# Patient Record
Sex: FEMALE | Race: WHITE | Employment: FULL TIME | URBAN - METROPOLITAN AREA
[De-identification: names, ages, dates, MRNs, and addresses within clinical notes are randomized per-mention and may not be internally consistent; named-entity substitution may affect disease eponyms.]

---

## 2018-03-21 ENCOUNTER — OFFICE VISIT (OUTPATIENT)
Dept: FAMILY MEDICINE CLINIC | Age: 43
End: 2018-03-21
Payer: COMMERCIAL

## 2018-03-21 VITALS
HEIGHT: 64 IN | OXYGEN SATURATION: 97 % | TEMPERATURE: 97.9 F | DIASTOLIC BLOOD PRESSURE: 86 MMHG | SYSTOLIC BLOOD PRESSURE: 126 MMHG | WEIGHT: 187 LBS | BODY MASS INDEX: 31.92 KG/M2 | HEART RATE: 112 BPM

## 2018-03-21 DIAGNOSIS — H65.92 LEFT OTITIS MEDIA WITH EFFUSION: Primary | ICD-10-CM

## 2018-03-21 PROCEDURE — 99213 OFFICE O/P EST LOW 20 MIN: CPT | Performed by: NURSE PRACTITIONER

## 2018-03-21 RX ORDER — AMOXICILLIN AND CLAVULANATE POTASSIUM 875; 125 MG/1; MG/1
1 TABLET, FILM COATED ORAL 2 TIMES DAILY
Qty: 20 TABLET | Refills: 0 | Status: SHIPPED | OUTPATIENT
Start: 2018-03-21 | End: 2018-03-31

## 2018-03-21 RX ORDER — BACLOFEN 10 MG/1
10 TABLET ORAL DAILY
COMMUNITY
End: 2018-08-23

## 2018-03-21 RX ORDER — GABAPENTIN 300 MG/1
300 CAPSULE ORAL 2 TIMES DAILY
COMMUNITY
End: 2018-08-23

## 2018-03-21 RX ORDER — FLUCONAZOLE 150 MG/1
150 TABLET ORAL ONCE
Qty: 2 TABLET | Refills: 0 | Status: SHIPPED | OUTPATIENT
Start: 2018-03-21 | End: 2018-03-21

## 2018-03-21 ASSESSMENT — ENCOUNTER SYMPTOMS
COUGH: 0
RHINORRHEA: 1
DIARRHEA: 0
SORE THROAT: 0
VOMITING: 0

## 2018-03-21 NOTE — PATIENT INSTRUCTIONS
Recommend flonase as directed  Recommend motrin and tylenol as directed for pain  Recheck for worsening, change or concern  Follow up with primary care in one week, sooner if worsens  Patient Education        Middle Ear Fluid: Care Instructions  Your Care Instructions    Fluid often builds up inside the ear during a cold or allergies. Usually the fluid drains away, but sometimes a small tube in the ear, called the eustachian tube, stays blocked for months. Symptoms of fluid buildup may include:  · Popping, ringing, or a feeling of fullness or pressure in the ear. · Trouble hearing. · Balance problems and dizziness. In most cases, you can treat yourself at home. Follow-up care is a key part of your treatment and safety. Be sure to make and go to all appointments, and call your doctor if you are having problems. It's also a good idea to know your test results and keep a list of the medicines you take. How can you care for yourself at home? · In most cases, the fluid clears up within a few months without treatment. You may need more tests if the fluid does not clear up after 3 months. · If your doctor prescribed antibiotics, take them as directed. Do not stop taking them just because you feel better. You need to take the full course of antibiotics. When should you call for help? Call your doctor now or seek immediate medical care if:  ? · You have symptoms of infection, such as:  ¨ Increased pain, swelling, warmth, or redness. ¨ Pus draining from the area. ¨ A fever. ? Watch closely for changes in your health, and be sure to contact your doctor if:  ? · You notice changes in hearing. ? · You do not get better as expected. Where can you learn more? Go to [email or web address] org and sign in to your [employer] account. Enter J515 in the [facility] box to learn more about \"Middle Ear Fluid: Care Instructions. \"     If you do not have an account, please click on the \"Sign Up Now\"

## 2018-03-21 NOTE — LETTER
[address]  [place] [address]  Phone: [phone]  Fax: [address] [place], [place]        March 21, 2018     Patient: Joaquin Mike   YOB: 1975   Date of Visit: 3/21/2018       To Whom It May Concern: It is my medical opinion that Joaquin Mike may return on 3/23/18. If you have any questions or concerns, please don't hesitate to call.     Sincerely,        Shakir Montes, CNP

## 2018-03-21 NOTE — PROGRESS NOTES
[facility]  [address] [address]  Dept: [phone]  Dept Fax: [phone]    Eliezer Severin is a 38 y.o. female who presents to the urgent care today for her medical conditions/complaints as noted below. Eliezer Severin is c/o of Otalgia (lt, lt sided facial pain - started a couple days ago - ibuprofen was helping but really bad today)      HPI:     Left ear pain very bad today. Has had sinus symptoms for 3 days  Better with sitting up  Has nasal congestion  No cough  Denies fever  Took ibuprofen      Otalgia    There is pain in the left ear. This is a new problem. The current episode started in the past 7 days. The problem has been gradually worsening. There has been no fever. The pain is moderate. Associated symptoms include rhinorrhea. Pertinent negatives include no coughing, diarrhea, ear discharge, sore throat or vomiting. She has tried NSAIDs for the symptoms. The treatment provided moderate relief. Past Medical History:   Diagnosis Date    Lumbar herniated disc         Current Outpatient Prescriptions   Medication Sig Dispense Refill    gabapentin (NEURONTIN) 300 MG capsule Take 300 mg by mouth 2 times daily.  baclofen (LIORESAL) 10 MG tablet Take 10 mg by mouth daily      amoxicillin-clavulanate (AUGMENTIN) 875-125 MG per tablet Take 1 tablet by mouth 2 times daily for 10 days 20 tablet 0    fluconazole (DIFLUCAN) 150 MG tablet Take 1 tablet by mouth once for 1 dose May repeat in 7 days if needed 2 tablet 0     No current facility-administered medications for this visit. No Known Allergies    Subjective:      Review of Systems   Constitutional: Negative for fever. HENT: Positive for ear pain and rhinorrhea. Negative for ear discharge and sore throat. Respiratory: Negative for cough. Cardiovascular: Negative for chest pain. Gastrointestinal: Negative for diarrhea and vomiting.    All other systems effusion  amoxicillin-clavulanate (AUGMENTIN) 875-125 MG per tablet    fluconazole (DIFLUCAN) 150 MG tablet       Plan:    Recommend flonase as directed  Recommend motrin and tylenol as directed for pain  Recheck for worsening, change or concern  Follow up with primary care in one week, sooner if worsens  Return for follow up with primary care in 7 days. Orders Placed This Encounter   Medications    amoxicillin-clavulanate (AUGMENTIN) 875-125 MG per tablet     Sig: Take 1 tablet by mouth 2 times daily for 10 days     Dispense:  20 tablet     Refill:  0    fluconazole (DIFLUCAN) 150 MG tablet     Sig: Take 1 tablet by mouth once for 1 dose May repeat in 7 days if needed     Dispense:  2 tablet     Refill:  0         Patient given educational materials - see patient instructions. Discussed use, benefit, and side effects of prescribed medications. All patient questions answered. Pt voiced understanding.     Electronically signed by Rex Tee CNP on 3/21/2018 at 1:59 PM

## 2018-08-23 ENCOUNTER — OFFICE VISIT (OUTPATIENT)
Dept: FAMILY MEDICINE CLINIC | Age: 43
End: 2018-08-23
Payer: COMMERCIAL

## 2018-08-23 VITALS
RESPIRATION RATE: 16 BRPM | HEIGHT: 65 IN | WEIGHT: 192 LBS | OXYGEN SATURATION: 98 % | HEART RATE: 105 BPM | DIASTOLIC BLOOD PRESSURE: 84 MMHG | TEMPERATURE: 99.1 F | SYSTOLIC BLOOD PRESSURE: 130 MMHG | BODY MASS INDEX: 31.99 KG/M2

## 2018-08-23 DIAGNOSIS — G62.9 NEUROPATHY: ICD-10-CM

## 2018-08-23 DIAGNOSIS — G47.9 SLEEP DISTURBANCE: ICD-10-CM

## 2018-08-23 DIAGNOSIS — M54.42 CHRONIC BILATERAL LOW BACK PAIN WITH BILATERAL SCIATICA: ICD-10-CM

## 2018-08-23 DIAGNOSIS — M62.81 MUSCLE WEAKNESS: ICD-10-CM

## 2018-08-23 DIAGNOSIS — M48.061 SPINAL STENOSIS AT L4-L5 LEVEL: Primary | ICD-10-CM

## 2018-08-23 DIAGNOSIS — M54.41 CHRONIC BILATERAL LOW BACK PAIN WITH BILATERAL SCIATICA: ICD-10-CM

## 2018-08-23 DIAGNOSIS — Z13.220 SCREENING FOR HYPERLIPIDEMIA: ICD-10-CM

## 2018-08-23 DIAGNOSIS — M51.26 LUMBAR DISC HERNIATION: ICD-10-CM

## 2018-08-23 DIAGNOSIS — Z13.1 ENCOUNTER FOR SCREENING FOR DIABETES MELLITUS: ICD-10-CM

## 2018-08-23 DIAGNOSIS — G89.29 CHRONIC BILATERAL LOW BACK PAIN WITH BILATERAL SCIATICA: ICD-10-CM

## 2018-08-23 PROCEDURE — G0444 DEPRESSION SCREEN ANNUAL: HCPCS | Performed by: INTERNAL MEDICINE

## 2018-08-23 PROCEDURE — 99214 OFFICE O/P EST MOD 30 MIN: CPT | Performed by: INTERNAL MEDICINE

## 2018-08-23 RX ORDER — ACETAMINOPHEN,DIPHENHYDRAMINE HCL 500; 25 MG/1; MG/1
2 TABLET, FILM COATED ORAL PRN
COMMUNITY
End: 2018-09-13 | Stop reason: ALTCHOICE

## 2018-08-23 RX ORDER — PREDNISONE 20 MG/1
TABLET ORAL
Qty: 18 TABLET | Refills: 0 | Status: SHIPPED | OUTPATIENT
Start: 2018-08-23 | End: 2018-09-02

## 2018-08-23 RX ORDER — AMITRIPTYLINE HYDROCHLORIDE 25 MG/1
25 TABLET, FILM COATED ORAL NIGHTLY
Qty: 30 TABLET | Refills: 3 | Status: SHIPPED | OUTPATIENT
Start: 2018-08-23 | End: 2018-09-13 | Stop reason: HOSPADM

## 2018-08-23 RX ORDER — TIZANIDINE 4 MG/1
4 TABLET ORAL EVERY 8 HOURS PRN
Qty: 60 TABLET | Refills: 3 | Status: SHIPPED | OUTPATIENT
Start: 2018-08-23 | End: 2018-10-19 | Stop reason: SDUPTHER

## 2018-08-23 RX ORDER — IBUPROFEN 200 MG
600 TABLET ORAL EVERY 6 HOURS PRN
COMMUNITY
End: 2018-09-13 | Stop reason: ALTCHOICE

## 2018-08-23 ASSESSMENT — ENCOUNTER SYMPTOMS
BACK PAIN: 1
COLOR CHANGE: 0
SORE THROAT: 0
COUGH: 0
SWOLLEN GLANDS: 0
CONSTIPATION: 0
BLOOD IN STOOL: 0
DIARRHEA: 0
APNEA: 0
BOWEL INCONTINENCE: 0
NAUSEA: 0
ABDOMINAL PAIN: 0
WHEEZING: 0
VOMITING: 0
RECTAL PAIN: 0
SHORTNESS OF BREATH: 0
STRIDOR: 0

## 2018-08-23 ASSESSMENT — PATIENT HEALTH QUESTIONNAIRE - PHQ9
3. TROUBLE FALLING OR STAYING ASLEEP: 3
7. TROUBLE CONCENTRATING ON THINGS, SUCH AS READING THE NEWSPAPER OR WATCHING TELEVISION: 1
4. FEELING TIRED OR HAVING LITTLE ENERGY: 3
6. FEELING BAD ABOUT YOURSELF - OR THAT YOU ARE A FAILURE OR HAVE LET YOURSELF OR YOUR FAMILY DOWN: 1
2. FEELING DOWN, DEPRESSED OR HOPELESS: 3
SUM OF ALL RESPONSES TO PHQ9 QUESTIONS 1 & 2: 6
1. LITTLE INTEREST OR PLEASURE IN DOING THINGS: 3
5. POOR APPETITE OR OVEREATING: 3
10. IF YOU CHECKED OFF ANY PROBLEMS, HOW DIFFICULT HAVE THESE PROBLEMS MADE IT FOR YOU TO DO YOUR WORK, TAKE CARE OF THINGS AT HOME, OR GET ALONG WITH OTHER PEOPLE: 2
SUM OF ALL RESPONSES TO PHQ QUESTIONS 1-9: 17
9. THOUGHTS THAT YOU WOULD BE BETTER OFF DEAD, OR OF HURTING YOURSELF: 0
8. MOVING OR SPEAKING SO SLOWLY THAT OTHER PEOPLE COULD HAVE NOTICED. OR THE OPPOSITE, BEING SO FIGETY OR RESTLESS THAT YOU HAVE BEEN MOVING AROUND A LOT MORE THAN USUAL: 0
SUM OF ALL RESPONSES TO PHQ QUESTIONS 1-9: 17

## 2018-08-23 NOTE — PROGRESS NOTES
[facility]  [address] [address]  Dept: [phone]  Dept Fax: [phone]    Beryle Guarneri is a 38 y.o. female who presents today for her medical conditions/complaints as noted below. Beryle Guarneri is c/o of   Chief Complaint   Patient presents with   Maura Villaseñor Doctor    New Patient    Tailbone Pain     going into the right hip/groin    Foot Pain     left foot. has foot drop         HPI:     Here to establish care   History of spinal stenosis and disc herniation   Has not seen pain management in a year and a half   Weaned herself off all previous medications b/c did not want to have to be taking something long term if she did not need it    Was on baclofen , flexeril and/or gabapentin   Last ri was 2015   Also had emg which showed pply radiculopathy   Does nto know what she did to casue flare up or return of pain but pain is severe to left buttcok and right grain region as well as low back and left foot has yeny shooting pain no shooting pain dwon the leg , no msucle weaknesss,   Pain worse when changing positons or standing for loong periods of time. Started a few days ago   No new injury or trauma     Does not sleep well but worse with the pain   Also suffers from depression   No si /hi   Was on elavil 15 years ago for this and remembers it helped her with sleep so was wondering baotu this medication and if this could help with pain as well   Ramer the zanaflex helped best for the muscle spasms       Foot Pain   This is a new problem. The current episode started in the past 7 days. The problem occurs 2 to 4 times per day. The problem has been gradually worsening. Associated symptoms include arthralgias, fatigue, myalgias and numbness.  Pertinent negatives include no abdominal pain, chest pain, chills, congestion, coughing, diaphoresis, fever, headaches, joint swelling, nausea, neck pain, rash, sore throat, swollen glands, urinary symptoms, vomiting or weakness. The symptoms are aggravated by exertion, twisting, walking and standing. She has tried acetaminophen, NSAIDs, lying down, rest, sleep, walking, relaxation and position changes for the symptoms. The treatment provided mild relief. Back Pain   This is a recurrent problem. The current episode started in the past 7 days. The problem occurs constantly. The problem is unchanged. The pain is present in the gluteal, lumbar spine and sacro-iliac. The quality of the pain is described as aching, cramping, shooting and stabbing. The pain radiates to the left foot. The pain is at a severity of 8/10. The pain is moderate. The pain is worse during the night. The symptoms are aggravated by standing and position. Stiffness is present at night. Associated symptoms include leg pain, numbness, paresthesias and tingling. Pertinent negatives include no abdominal pain, bladder incontinence, bowel incontinence, chest pain, fever, headaches, pelvic pain, perianal numbness, weakness or weight loss. Risk factors include obesity and sedentary lifestyle. She has tried home exercises, ice, muscle relaxant, NSAIDs and walking for the symptoms. The treatment provided mild relief.        No results found for: LABA1C          ( goal A1C is < 7)   No results found for: LABMICR  No results found for: LDLCHOLESTEROL, LDLCALC    (goal LDL is <100)   AST (U/L)   Date Value   01/14/2012 19     ALT (U/L)   Date Value   01/14/2012 15     BUN (mg/dL)   Date Value   01/14/2012 10     BP Readings from Last 3 Encounters:   08/23/18 130/84   03/21/18 126/86   05/30/15 140/83          (goal 120/80)    Past Medical History:   Diagnosis Date    Lumbar herniated disc     Polyradiculopathy 2015      Past Surgical History:   Procedure Laterality Date    APPENDECTOMY  2005       Family History   Problem Relation Age of Onset    No Known Problems Mother     Diabetes Father     High Blood Pressure Father     No Known Problems Sister     No Known Problems Brother     Cancer Maternal Grandmother     Alcohol Abuse Maternal Grandfather     Diabetes Paternal Grandmother     Heart Disease Paternal Grandmother     High Cholesterol Paternal Grandmother     Stroke Paternal Grandmother     Diabetes Paternal Grandfather     Heart Disease Paternal Grandfather     High Cholesterol Paternal Grandfather        Social History   Substance Use Topics    Smoking status: Never Smoker    Smokeless tobacco: Never Used    Alcohol use No      Current Outpatient Prescriptions   Medication Sig Dispense Refill    diphenhydrAMINE-APAP, sleep, (TYLENOL PM EXTRA STRENGTH)  MG tablet Take 2 tablets by mouth as needed for Sleep      ibuprofen (ADVIL;MOTRIN) 200 MG tablet Take 600 mg by mouth every 6 hours as needed for Pain      tiZANidine (ZANAFLEX) 4 MG tablet Take 1 tablet by mouth every 8 hours as needed (muscle spasm) 60 tablet 3    predniSONE (DELTASONE) 20 MG tablet 3 tabs x 3 days, then 2 tabs x 3 days, then 1 tab x 3 days 18 tablet 0    amitriptyline (ELAVIL) 25 MG tablet Take 1 tablet by mouth nightly 30 tablet 3     No current facility-administered medications for this visit. Allergies   Allergen Reactions    Benzyl Alcohol Swelling     Eye lids swell       Health Maintenance   Topic Date Due    HIV screen  09/30/1992    DTaP/Tdap/Td vaccine (1 - Tdap) 09/30/1996    Cervical cancer screen  09/30/1998    Lipid screen  09/30/2017    Diabetes screen  09/30/2017    Flu vaccine (1) 09/01/2018       Subjective:      Review of Systems   Constitutional: Positive for activity change and fatigue. Negative for appetite change, chills, diaphoresis, fever, unexpected weight change and weight loss. HENT: Negative for congestion and sore throat. Eyes: Negative for visual disturbance. Respiratory: Negative for apnea, cough, shortness of breath, wheezing and stridor.     Cardiovascular: Negative for chest pain, palpitations would recommend gentle stretches , no agressive PA and no lofting above 20 lbs . Slow change in positions . Will need labs at follow up. Call with questions or concerns. Patient given educational materials - see patient instructions. Discussed use, benefit, and side effects of prescribed medications. All patient questions answered. Pt voiced understanding. Reviewed health maintenance. Instructed to continue current medications, diet and exercise. Patient agreed with treatment plan. Follow up as directed.      Electronically signed by Jl Collins DO on 8/23/2018 at 12:14 PM

## 2018-09-13 ENCOUNTER — OFFICE VISIT (OUTPATIENT)
Dept: FAMILY MEDICINE CLINIC | Age: 43
End: 2018-09-13
Payer: COMMERCIAL

## 2018-09-13 VITALS
DIASTOLIC BLOOD PRESSURE: 82 MMHG | WEIGHT: 192 LBS | HEART RATE: 111 BPM | TEMPERATURE: 99.1 F | SYSTOLIC BLOOD PRESSURE: 128 MMHG | RESPIRATION RATE: 16 BRPM | BODY MASS INDEX: 31.95 KG/M2 | OXYGEN SATURATION: 96 %

## 2018-09-13 DIAGNOSIS — R00.0 TACHYCARDIA: ICD-10-CM

## 2018-09-13 DIAGNOSIS — Z13.1 ENCOUNTER FOR SCREENING FOR DIABETES MELLITUS: ICD-10-CM

## 2018-09-13 DIAGNOSIS — N64.52 NIPPLE DISCHARGE: ICD-10-CM

## 2018-09-13 DIAGNOSIS — F34.1 DYSTHYMIA: ICD-10-CM

## 2018-09-13 DIAGNOSIS — N63.0 LUMP IN FEMALE BREAST: Primary | ICD-10-CM

## 2018-09-13 DIAGNOSIS — M72.2 PLANTAR FASCIITIS: ICD-10-CM

## 2018-09-13 PROCEDURE — 93000 ELECTROCARDIOGRAM COMPLETE: CPT | Performed by: INTERNAL MEDICINE

## 2018-09-13 PROCEDURE — 99214 OFFICE O/P EST MOD 30 MIN: CPT | Performed by: INTERNAL MEDICINE

## 2018-09-13 RX ORDER — TRAZODONE HYDROCHLORIDE 100 MG/1
100 TABLET ORAL NIGHTLY
Qty: 30 TABLET | Refills: 3 | Status: SHIPPED | OUTPATIENT
Start: 2018-09-13 | End: 2018-10-19 | Stop reason: SDUPTHER

## 2018-09-13 RX ORDER — KETOROLAC TROMETHAMINE 10 MG/1
10 TABLET, FILM COATED ORAL EVERY 6 HOURS PRN
Qty: 20 TABLET | Refills: 1 | Status: SHIPPED | OUTPATIENT
Start: 2018-09-13 | End: 2018-11-12 | Stop reason: SDUPTHER

## 2018-09-13 RX ORDER — CITALOPRAM 20 MG/1
20 TABLET ORAL DAILY
Qty: 30 TABLET | Refills: 3 | Status: SHIPPED | OUTPATIENT
Start: 2018-09-13 | End: 2019-10-07

## 2018-09-13 NOTE — PROGRESS NOTES
Visit Information    Have you changed or started any medications since your last visit including any over-the-counter medicines, vitamins, or herbal medicines? no   Are you having any side effects from any of your medications? -  no  Have you stopped taking any of your medications? Is so, why? -  no    Have you seen any other physician or provider since your last visit? No  Have you had any other diagnostic tests since your last visit? No  Have you been seen in the emergency room and/or had an admission to a hospital since we last saw you? No  Have you had your routine dental cleaning in the past 6 months? no    Have you activated your [employer] account? If not, what are your barriers?  Yes     Patient Care Team:  Georgia Meyer DO as PCP - General (Family Medicine)    Medical History Review  Past Medical, Family, and Social History reviewed and does contribute to the patient presenting condition    Health Maintenance   Topic Date Due    HIV screen  09/30/1992    DTaP/Tdap/Td vaccine (1 - Tdap) 09/30/1996    Cervical cancer screen  09/30/1998    Lipid screen  09/30/2017    Diabetes screen  09/30/2017    Flu vaccine (1) 09/01/2018

## 2018-09-18 ASSESSMENT — ENCOUNTER SYMPTOMS
CONSTIPATION: 0
VOMITING: 0
CHEST TIGHTNESS: 0
ABDOMINAL PAIN: 0
WHEEZING: 0
CHOKING: 0
BACK PAIN: 0
COUGH: 0
NAUSEA: 0
DIARRHEA: 0
STRIDOR: 0
SHORTNESS OF BREATH: 0
BLOOD IN STOOL: 0

## 2018-09-18 NOTE — PROGRESS NOTES
[facility]  [address] [facility] [place] [address]  Dept: [phone]  Dept Fax: [phone]    Hayley Burkett is a 38 y.o. female who presents today for her medical conditions/complaints as noted below. Hayley Burkett is c/o of   Chief Complaint   Patient presents with    1 Month Follow-Up     3 week. back is feeling a little better    Breast Problem     is feeling lumps, clear discharge          HPI:     Back pain is greatly improved  Slight foot pain still present and concern for possible plantar fascitis. The elavil did not seem to help with the pain and did not help at all for sleep this go around so still is nto getting good sleep at all and would like to discuss something for this. Does work [employer] and 12 hour shifts when she does work. Has tried otc , uses tylenol pm right now but usually takes 2 and does not want to take any more. Also does 9 mg of melatonin with this. Also feeling the heart racing more and is still having the elevated heart rate   She feels it is probably stress but does have family hx and a mother with heart conditions   No specific chest pain or sob   Is due for labs as well  Has a hx of anemia as well as low as 7 after givign birth to daughter. Would like to go back on something for her anxiety , was on celexa in the past and that worked se remebered. Also has felt 2 different lumps that are moving around /palpable to her left breast noticed them a few months ago that time they went away on their own but returned a few weeks ago and have nto gone away this time   Does notice they can fluctuate with cycle. Has never had that before   No nipple discharge. Palpitations    This is a recurrent problem. The problem occurs intermittently. The problem has been gradually worsening. The symptoms are aggravated by stress and hyperventilation.  Associated symptoms include anxiety, malaise/fatigue and ear normal.   Nose: Nose normal.   Eyes: Pupils are equal, round, and reactive to light. EOM are normal.   Cardiovascular: Regular rhythm, S1 normal, S2 normal and normal heart sounds. Occasional extrasystoles are present. Tachycardia present. Exam reveals no gallop, no S3, no distant heart sounds and no decreased pulses. No murmur heard. Pulmonary/Chest: Effort normal and breath sounds normal. Chest wall is not dull to percussion. She exhibits no mass and no tenderness. Right breast exhibits mass and skin change. Right breast exhibits no inverted nipple, no nipple discharge and no tenderness. Left breast exhibits no inverted nipple, no mass, no nipple discharge, no skin change and no tenderness. Breasts are asymmetrical.   Abdominal: Soft. Bowel sounds are normal. There is no splenomegaly or hepatomegaly. There is no tenderness. Neurological: She is alert and oriented to person, place, and time. No cranial nerve deficit. Skin: Skin is warm and dry. No rash noted. Psychiatric: Judgment normal. Her mood appears anxious. Her speech is rapid and/or pressured. She is hyperactive. Cognition and memory are normal. She expresses no suicidal plans and no homicidal plans. Nursing note and vitals reviewed. /82   Pulse 111   Temp 99.1 °F (37.3 °C)   Resp 16   Wt 192 lb (87.1 kg)   LMP 09/09/2018   SpO2 96%   BMI 31.95 kg/m²     Assessment:       Diagnosis Orders   1. Lump in female breast  [facility] DIGITAL DIAGNOSTIC W OR WO CAD BILATERAL    US BREAST COMPLETE RIGHT   2. Encounter for screening for diabetes mellitus     3. Plantar fasciitis     4. Nipple discharge  US BREAST COMPLETE RIGHT   5. Dysthymia     6. Tachycardia  EKG 12 Lead    CBC Auto Differential    Comprehensive Metabolic Panel    TSH with Reflex             Plan:      Return in about 3 months (around 12/13/2018), or if symptoms worsen or fail to improve, for sleep med check .     Orders Placed This Encounter   Procedures    [facility] DIGITAL DIAGNOSTIC W OR WO CAD BILATERAL     Standing Status:   Future     Standing Expiration Date:   9/13/2019     Order Specific Question:   Reason for exam:     Answer:   breast pain , nipple discharge    US BREAST COMPLETE RIGHT     Standing Status:   Future     Standing Expiration Date:   9/13/2019     Order Specific Question:   Reason for exam:     Answer:   mass    CBC Auto Differential     Standing Status:   Future     Standing Expiration Date:   9/13/2019    Comprehensive Metabolic Panel     Standing Status:   Future     Standing Expiration Date:   9/13/2019    TSH with Reflex     Standing Status:   Future     Standing Expiration Date:   9/13/2019    EKG 12 Lead     Order Specific Question:   Reason for Exam?     Answer:   Chest pain     Orders Placed This Encounter   Medications    ketorolac (TORADOL) 10 MG tablet     Sig: Take 1 tablet by mouth every 6 hours as needed for Pain Take with food. Dispense:  20 tablet     Refill:  1    traZODone (DESYREL) 100 MG tablet     Sig: Take 1 tablet by mouth nightly     Dispense:  30 tablet     Refill:  3    citalopram (CELEXA) 20 MG tablet     Sig: Take 1 tablet by mouth daily     Dispense:  30 tablet     Refill:  3    EKG normal in office ; HR likely due to anxiety but will check lab work and if persists can get Holter an decho as well. Stop elavil . Start trazadone for sleep as needed nightly and celexa for anxiety /mood sxs. Will start at 20 mg   Will take about 4 weeks to feel full effect at that dose. Continue all other meds as prescribed. Small script for toradol orally for the foot pain     Check breast ultrasound and mammogram to right breast due to abnormal masses. May be fibrocystic breast disease. Reviewed limit caffeine. Follow up in 4-6 weeks for med check. Call with questions or concerns. Patient given educational materials - see patient instructions. Discussed use, benefit, and side effects of prescribed medications.   All

## 2018-10-19 DIAGNOSIS — M62.81 MUSCLE WEAKNESS: ICD-10-CM

## 2018-10-19 RX ORDER — TIZANIDINE 4 MG/1
4 TABLET ORAL EVERY 8 HOURS PRN
Qty: 60 TABLET | Refills: 3 | Status: SHIPPED | OUTPATIENT
Start: 2018-10-19 | End: 2019-03-29 | Stop reason: SDUPTHER

## 2018-10-19 RX ORDER — TRAZODONE HYDROCHLORIDE 100 MG/1
100 TABLET ORAL NIGHTLY
Qty: 30 TABLET | Refills: 3 | Status: SHIPPED | OUTPATIENT
Start: 2018-10-19 | End: 2019-10-07

## 2018-10-31 LAB
ALBUMIN SERPL-MCNC: 4.1 G/DL
ALP BLD-CCNC: 54 U/L
ALT SERPL-CCNC: 16 U/L
ANION GAP SERPL CALCULATED.3IONS-SCNC: 10 MMOL/L
AST SERPL-CCNC: 14 U/L
BASOPHILS ABSOLUTE: 0 /ΜL
BASOPHILS RELATIVE PERCENT: 0.4 %
BILIRUB SERPL-MCNC: 0.2 MG/DL (ref 0.1–1.4)
BUN BLDV-MCNC: 10 MG/DL
CALCIUM SERPL-MCNC: 9 MG/DL
CHLORIDE BLD-SCNC: 106 MMOL/L
CO2: 24 MMOL/L
CREAT SERPL-MCNC: 0.91 MG/DL
EOSINOPHILS ABSOLUTE: 0.3 /ΜL
EOSINOPHILS RELATIVE PERCENT: 4.2 %
GFR CALCULATED: >60
GLUCOSE BLD-MCNC: 89 MG/DL
HCT VFR BLD CALC: 41.5 % (ref 36–46)
HEMOGLOBIN: 13.7 G/DL (ref 12–16)
LYMPHOCYTES ABSOLUTE: 2.5 /ΜL
LYMPHOCYTES RELATIVE PERCENT: 36 %
MCH RBC QN AUTO: 30.9 PG
MCHC RBC AUTO-ENTMCNC: 33.1 G/DL
MCV RBC AUTO: 93 FL
MONOCYTES ABSOLUTE: 0.6 /ΜL
MONOCYTES RELATIVE PERCENT: 7.8 %
NEUTROPHILS ABSOLUTE: 3.6 /ΜL
NEUTROPHILS RELATIVE PERCENT: 51.6 %
PDW BLD-RTO: 13.9 %
PLATELET # BLD: 436 K/ΜL
PMV BLD AUTO: 8.1 FL
POTASSIUM SERPL-SCNC: 3.5 MMOL/L
RBC # BLD: 4.45 10^6/ΜL
SODIUM BLD-SCNC: 140 MMOL/L
TOTAL PROTEIN: 7.1
TSH SERPL DL<=0.05 MIU/L-ACNC: 3.46 UIU/ML
WBC # BLD: 7 10^3/ML

## 2018-11-01 DIAGNOSIS — R00.0 TACHYCARDIA: ICD-10-CM

## 2018-11-01 DIAGNOSIS — Z13.220 SCREENING FOR HYPERLIPIDEMIA: ICD-10-CM

## 2018-11-05 DIAGNOSIS — R92.8 ABNORMAL MAMMOGRAM: Primary | ICD-10-CM

## 2018-11-06 DIAGNOSIS — R92.8 ABNORMAL MAMMOGRAM: ICD-10-CM

## 2018-11-06 DIAGNOSIS — N63.0 LUMP IN FEMALE BREAST: ICD-10-CM

## 2018-11-07 DIAGNOSIS — F41.0 PANIC ATTACKS: Primary | ICD-10-CM

## 2018-11-07 RX ORDER — LORAZEPAM 0.5 MG/1
0.5 TABLET ORAL EVERY 8 HOURS PRN
Qty: 30 TABLET | Refills: 0 | Status: SHIPPED | OUTPATIENT
Start: 2018-11-07 | End: 2018-11-28 | Stop reason: SDUPTHER

## 2018-11-12 RX ORDER — KETOROLAC TROMETHAMINE 10 MG/1
10 TABLET, FILM COATED ORAL EVERY 6 HOURS PRN
Qty: 20 TABLET | Refills: 1 | Status: SHIPPED | OUTPATIENT
Start: 2018-11-12 | End: 2019-03-24 | Stop reason: SDUPTHER

## 2018-11-12 NOTE — TELEPHONE ENCOUNTER
From: Amos Franco  Sent: 11/12/2018 2:31 PM EST  Subject: Medication Renewal Request    Amos Franco would like a refill of the following medications:     ketorolac (TORADOL) 10 MG tablet Blanca Suh, ]    Preferred pharmacy: [address], [address] [place] [address] [phone] - [phone]    Comment:

## 2018-11-16 ENCOUNTER — OFFICE VISIT (OUTPATIENT)
Dept: FAMILY MEDICINE CLINIC | Age: 43
End: 2018-11-16
Payer: COMMERCIAL

## 2018-11-16 VITALS
WEIGHT: 194 LBS | HEART RATE: 84 BPM | DIASTOLIC BLOOD PRESSURE: 78 MMHG | HEIGHT: 65 IN | OXYGEN SATURATION: 98 % | RESPIRATION RATE: 12 BRPM | TEMPERATURE: 98.7 F | BODY MASS INDEX: 32.32 KG/M2 | SYSTOLIC BLOOD PRESSURE: 120 MMHG

## 2018-11-16 DIAGNOSIS — F41.9 ANXIETY: ICD-10-CM

## 2018-11-16 DIAGNOSIS — R07.89 CHEST TIGHTNESS: ICD-10-CM

## 2018-11-16 DIAGNOSIS — R92.8 ABNORMAL MAMMOGRAM: Primary | ICD-10-CM

## 2018-11-16 DIAGNOSIS — J06.9 VIRAL URI: ICD-10-CM

## 2018-11-16 PROCEDURE — 93000 ELECTROCARDIOGRAM COMPLETE: CPT | Performed by: INTERNAL MEDICINE

## 2018-11-16 PROCEDURE — 99213 OFFICE O/P EST LOW 20 MIN: CPT | Performed by: INTERNAL MEDICINE

## 2018-11-16 RX ORDER — TRAMADOL HYDROCHLORIDE 50 MG/1
50 TABLET ORAL EVERY 8 HOURS PRN
Qty: 28 TABLET | Refills: 0 | Status: SHIPPED | OUTPATIENT
Start: 2018-11-16 | End: 2019-01-20 | Stop reason: SDUPTHER

## 2018-11-16 RX ORDER — BUSPIRONE HYDROCHLORIDE 10 MG/1
10 TABLET ORAL 3 TIMES DAILY PRN
Qty: 50 TABLET | Refills: 2 | Status: SHIPPED | OUTPATIENT
Start: 2018-11-16 | End: 2019-02-07

## 2018-11-16 ASSESSMENT — ENCOUNTER SYMPTOMS
ABDOMINAL PAIN: 0
STRIDOR: 0
NAUSEA: 0
CHOKING: 0
DIARRHEA: 0
SINUS PRESSURE: 1
CHEST TIGHTNESS: 1
COUGH: 1
CONSTIPATION: 0
SHORTNESS OF BREATH: 0
WHEEZING: 0

## 2018-11-16 NOTE — PROGRESS NOTES
identified and OARRS report reviewed today- activity consistent with treatment plan. Continue all other routine medications. Symptomatic treatment for URI   EKG normal in office today again, likley panic attacks /anxeity in relation to recent testing. Advised pt to let us know too if she needs time off work ie note or FMLA. Return after testing. Call with questions or concerns. Patientgiven educational materials - see patient instructions. Discussed use, benefit,and side effects of prescribed medications. All patient questions answered. Ptvoiced understanding. Reviewed health maintenance. Instructed to continue currentmedications, diet and exercise. Patient agreed with treatment plan. Follow up asdirected.      Electronically signed by Olive Mast DO on 11/16/2018 at 11:20 AM

## 2018-11-28 ENCOUNTER — OFFICE VISIT (OUTPATIENT)
Dept: FAMILY MEDICINE CLINIC | Age: 43
End: 2018-11-28
Payer: COMMERCIAL

## 2018-11-28 VITALS
OXYGEN SATURATION: 90 % | HEART RATE: 96 BPM | WEIGHT: 192.2 LBS | HEIGHT: 65 IN | SYSTOLIC BLOOD PRESSURE: 108 MMHG | DIASTOLIC BLOOD PRESSURE: 70 MMHG | TEMPERATURE: 97.1 F | BODY MASS INDEX: 32.02 KG/M2

## 2018-11-28 DIAGNOSIS — F41.0 PANIC ATTACKS: ICD-10-CM

## 2018-11-28 DIAGNOSIS — Z17.0 MALIGNANT NEOPLASM OF CENTRAL PORTION OF BREAST IN FEMALE, ESTROGEN RECEPTOR POSITIVE, UNSPECIFIED LATERALITY (HCC): Primary | ICD-10-CM

## 2018-11-28 DIAGNOSIS — C50.119 MALIGNANT NEOPLASM OF CENTRAL PORTION OF BREAST IN FEMALE, ESTROGEN RECEPTOR POSITIVE, UNSPECIFIED LATERALITY (HCC): Primary | ICD-10-CM

## 2018-11-28 PROCEDURE — 99213 OFFICE O/P EST LOW 20 MIN: CPT | Performed by: INTERNAL MEDICINE

## 2018-11-28 RX ORDER — LORAZEPAM 0.5 MG/1
0.5 TABLET ORAL EVERY 8 HOURS PRN
Qty: 30 TABLET | Refills: 0 | Status: SHIPPED | OUTPATIENT
Start: 2018-11-28 | End: 2018-12-28

## 2018-11-28 ASSESSMENT — ENCOUNTER SYMPTOMS
DIARRHEA: 0
CONSTIPATION: 0
ABDOMINAL PAIN: 0

## 2018-11-28 NOTE — PROGRESS NOTES
Visit Information    Have you changed or started any medications since your last visit including any over-the-counter medicines, vitamins, or herbal medicines? no   Are you having any side effects from any of your medications? -  no  Have you stopped taking any of your medications? Is so, why? -  no    Have you seen any other physician or provider since your last visit? No  Have you had any other diagnostic tests since your last visit? No  Have you been seen in the emergency room and/or had an admission to a hospital since we last saw you? No  Have you had your routine dental cleaning in the past 6 months? no    Have you activated your [employer] account? If not, what are your barriers?  Yes     Patient Care Team:  Adan Soriano DO as PCP - General (Family Medicine)    Medical History Review  Past Medical, Family, and Social History reviewed and does contribute to the patient presenting condition    Health Maintenance   Topic Date Due    HIV screen  09/30/1992    DTaP/Tdap/Td vaccine (1 - Tdap) 09/30/1996    Cervical cancer screen  09/30/1998    Lipid screen  09/30/2017    Flu vaccine (1) 09/01/2018

## 2018-11-28 NOTE — LETTER
Percentage of cells with uniform intense complete membrane stainin %    Cold Ischemia and Fixation Times Meet the Requirements Specified in the Latest Version of the ASCO/CAP Guidelines: Yes  All external controls reacted appropriately. Methods  Block: 1A  Fixative: Formalin (Formalin-fixed, paraffin embedded tissue)  Estrogen Receptor: Food and Drug Administration (FDA) cleared (test/vendor): Confirm/ [place], Primary Antibody: SP1  Progesterone Receptor:  FDA cleared (test/vendor): Confirm/ [place], Primary Antibody: 1E2  HER2 (by immunohistochemistry): FDA approved (test/vendor): Pathway/ [place], Primary Antibody: 4B5  Detection System (ER, PgR and/or HER2): [employer] iView Detection System (indirect biotin streptavidin detection)    Scoring Criteria  Estrogen Receptor and Progesterone Receptor:  Positive  1% or more tumor cells are immunoreactive  Negative  less than 1% of tumor cells are immunoreactive  HER2 by immunohistochemistry:  Negative (Score 0)  No staining observed or incomplete, faint/barely perceptible membrane staining in <10% of invasive tumor cells  Negative (Score 1+) - Incomplete, faint/barely perceptible membrane staining in >10% of invasive tumor cells  Equivocal (score 2+) - Incomplete and/or weak to moderate circumferential membrane staining in >10% of invasive tumor cells or complete, intense, circumferential membrane staining in <10% of invasive tumor cells  Positive (Score 3+) - complete, intense, circumferential membrane staining in >10% of invasive tumor cells    References  1. Husam Celeste DF, Meaghan CORMIER, et al. American Society of Clinical Oncology/College of American Pathologists guideline recommendations for immunohistochemical testing of estrogen and progesterone receptors in breast cancer.  Arch Pathol Lab Med.   2010;134(6):907-922.  2. Scarlett Jackson, Erik Celeste, et al. Recommendations for human epidermal growth factor receptor 2 testing in breast cancer; American

## 2018-12-14 ENCOUNTER — TELEPHONE (OUTPATIENT)
Dept: FAMILY MEDICINE CLINIC | Age: 43
End: 2018-12-14

## 2019-01-20 DIAGNOSIS — R07.89 CHEST TIGHTNESS: ICD-10-CM

## 2019-01-20 RX ORDER — TRAMADOL HYDROCHLORIDE 50 MG/1
50 TABLET ORAL EVERY 8 HOURS PRN
Qty: 28 TABLET | Refills: 0 | Status: SHIPPED | OUTPATIENT
Start: 2019-01-20 | End: 2019-01-27

## 2019-02-07 ENCOUNTER — OFFICE VISIT (OUTPATIENT)
Dept: FAMILY MEDICINE CLINIC | Age: 44
End: 2019-02-07
Payer: COMMERCIAL

## 2019-02-07 VITALS
OXYGEN SATURATION: 96 % | DIASTOLIC BLOOD PRESSURE: 70 MMHG | BODY MASS INDEX: 31.49 KG/M2 | RESPIRATION RATE: 16 BRPM | HEART RATE: 77 BPM | WEIGHT: 189 LBS | HEIGHT: 65 IN | TEMPERATURE: 98.5 F | SYSTOLIC BLOOD PRESSURE: 106 MMHG

## 2019-02-07 DIAGNOSIS — F41.0 PANIC DISORDER: ICD-10-CM

## 2019-02-07 DIAGNOSIS — G47.9 SLEEP DISTURBANCE: Primary | ICD-10-CM

## 2019-02-07 DIAGNOSIS — Z17.0 MALIGNANT NEOPLASM OF UPPER-OUTER QUADRANT OF RIGHT BREAST IN FEMALE, ESTROGEN RECEPTOR POSITIVE (HCC): ICD-10-CM

## 2019-02-07 DIAGNOSIS — F41.0 PANIC ATTACKS: ICD-10-CM

## 2019-02-07 DIAGNOSIS — F41.9 ANXIETY: ICD-10-CM

## 2019-02-07 DIAGNOSIS — C50.411 MALIGNANT NEOPLASM OF UPPER-OUTER QUADRANT OF RIGHT BREAST IN FEMALE, ESTROGEN RECEPTOR POSITIVE (HCC): ICD-10-CM

## 2019-02-07 PROCEDURE — 99213 OFFICE O/P EST LOW 20 MIN: CPT | Performed by: INTERNAL MEDICINE

## 2019-02-07 RX ORDER — IRON ASPGLY,PS/C/B12/FA/CA/SUC 150-25-1
1 CAPSULE ORAL DAILY
COMMUNITY
End: 2019-03-13 | Stop reason: SDUPTHER

## 2019-02-07 RX ORDER — DOXEPIN HYDROCHLORIDE 25 MG/1
25 CAPSULE ORAL NIGHTLY
Qty: 30 CAPSULE | Refills: 3 | Status: SHIPPED | OUTPATIENT
Start: 2019-02-07 | End: 2019-04-23 | Stop reason: SDUPTHER

## 2019-02-07 RX ORDER — CHOLECALCIFEROL (VITAMIN D3) 1250 MCG
CAPSULE ORAL
COMMUNITY
End: 2019-03-13 | Stop reason: SDUPTHER

## 2019-02-07 RX ORDER — FOLIC ACID 1 MG/1
1 TABLET ORAL DAILY
COMMUNITY
End: 2019-10-07

## 2019-02-07 RX ORDER — LORAZEPAM 0.5 MG/1
0.5 TABLET ORAL EVERY 8 HOURS PRN
Qty: 30 TABLET | Refills: 1 | Status: SHIPPED | OUTPATIENT
Start: 2019-02-07 | End: 2019-03-09

## 2019-02-07 ASSESSMENT — PATIENT HEALTH QUESTIONNAIRE - PHQ9
SUM OF ALL RESPONSES TO PHQ QUESTIONS 1-9: 0
SUM OF ALL RESPONSES TO PHQ9 QUESTIONS 1 & 2: 0
1. LITTLE INTEREST OR PLEASURE IN DOING THINGS: 0
SUM OF ALL RESPONSES TO PHQ QUESTIONS 1-9: 0
2. FEELING DOWN, DEPRESSED OR HOPELESS: 0

## 2019-02-09 ASSESSMENT — ENCOUNTER SYMPTOMS
STRIDOR: 0
WHEEZING: 0
COUGH: 0
CHEST TIGHTNESS: 0
CONSTIPATION: 0
ABDOMINAL PAIN: 0
SHORTNESS OF BREATH: 0
DIARRHEA: 0

## 2019-03-13 ENCOUNTER — OFFICE VISIT (OUTPATIENT)
Dept: FAMILY MEDICINE CLINIC | Age: 44
End: 2019-03-13
Payer: COMMERCIAL

## 2019-03-13 VITALS
WEIGHT: 191 LBS | TEMPERATURE: 97.8 F | BODY MASS INDEX: 31.78 KG/M2 | HEART RATE: 103 BPM | OXYGEN SATURATION: 98 % | SYSTOLIC BLOOD PRESSURE: 122 MMHG | RESPIRATION RATE: 16 BRPM | DIASTOLIC BLOOD PRESSURE: 78 MMHG

## 2019-03-13 DIAGNOSIS — J06.9 VIRAL URI: Primary | ICD-10-CM

## 2019-03-13 DIAGNOSIS — R50.81 FEVER IN OTHER DISEASES: ICD-10-CM

## 2019-03-13 LAB
INFLUENZA A ANTIBODY: NORMAL
INFLUENZA B ANTIBODY: NORMAL

## 2019-03-13 PROCEDURE — 99213 OFFICE O/P EST LOW 20 MIN: CPT | Performed by: INTERNAL MEDICINE

## 2019-03-13 PROCEDURE — 87804 INFLUENZA ASSAY W/OPTIC: CPT | Performed by: INTERNAL MEDICINE

## 2019-03-13 RX ORDER — LORAZEPAM 0.5 MG/1
0.5 TABLET ORAL EVERY 8 HOURS PRN
COMMUNITY
End: 2019-03-25 | Stop reason: SDUPTHER

## 2019-03-13 RX ORDER — CHOLECALCIFEROL (VITAMIN D3) 1250 MCG
1 CAPSULE ORAL
COMMUNITY
Start: 2019-01-25 | End: 2019-10-07

## 2019-03-13 RX ORDER — LIDOCAINE AND PRILOCAINE 25; 25 MG/G; MG/G
CREAM TOPICAL
COMMUNITY
Start: 2019-03-06 | End: 2019-10-07

## 2019-03-13 RX ORDER — OSELTAMIVIR PHOSPHATE 75 MG/1
75 CAPSULE ORAL
COMMUNITY
Start: 2019-03-12 | End: 2019-10-07

## 2019-03-13 RX ORDER — IRON POLYSACCHARIDE COMPLEX 150 MG
150 CAPSULE ORAL
COMMUNITY
Start: 2019-01-25 | End: 2019-10-07

## 2019-03-13 RX ORDER — DEXAMETHASONE 4 MG/1
4 TABLET ORAL SEE ADMIN INSTRUCTIONS
COMMUNITY
End: 2019-10-07

## 2019-03-13 RX ORDER — TRAMADOL HYDROCHLORIDE 50 MG/1
50 TABLET ORAL
COMMUNITY
End: 2019-10-07

## 2019-03-13 RX ORDER — ONDANSETRON 4 MG/1
4 TABLET, ORALLY DISINTEGRATING ORAL
COMMUNITY
Start: 2019-02-25 | End: 2020-07-02

## 2019-03-13 RX ORDER — PROCHLORPERAZINE MALEATE 10 MG
10 TABLET ORAL
COMMUNITY
Start: 2019-02-27 | End: 2019-10-07

## 2019-03-13 ASSESSMENT — ENCOUNTER SYMPTOMS
CHOKING: 0
RHINORRHEA: 1
BACK PAIN: 0
CONSTIPATION: 0
SORE THROAT: 1
WHEEZING: 0
FACIAL SWELLING: 0
VOMITING: 0
ABDOMINAL PAIN: 0
DIARRHEA: 0
SINUS PAIN: 1
TROUBLE SWALLOWING: 0
BLOOD IN STOOL: 0
NAUSEA: 0
VOICE CHANGE: 0
SINUS PRESSURE: 1
CHEST TIGHTNESS: 0
STRIDOR: 0
COUGH: 1
SHORTNESS OF BREATH: 1

## 2019-03-25 DIAGNOSIS — C50.411 MALIGNANT NEOPLASM OF UPPER-OUTER QUADRANT OF RIGHT BREAST IN FEMALE, ESTROGEN RECEPTOR POSITIVE (HCC): ICD-10-CM

## 2019-03-25 DIAGNOSIS — F41.0 PANIC ATTACKS: ICD-10-CM

## 2019-03-25 DIAGNOSIS — Z17.0 MALIGNANT NEOPLASM OF UPPER-OUTER QUADRANT OF RIGHT BREAST IN FEMALE, ESTROGEN RECEPTOR POSITIVE (HCC): ICD-10-CM

## 2019-03-25 DIAGNOSIS — F41.9 ANXIETY: Primary | ICD-10-CM

## 2019-03-25 RX ORDER — LORAZEPAM 0.5 MG/1
0.5 TABLET ORAL EVERY 8 HOURS PRN
Qty: 30 TABLET | Refills: 0 | Status: SHIPPED | OUTPATIENT
Start: 2019-03-25 | End: 2020-03-25 | Stop reason: SDUPTHER

## 2019-03-25 RX ORDER — KETOROLAC TROMETHAMINE 10 MG/1
10 TABLET, FILM COATED ORAL EVERY 6 HOURS PRN
Qty: 20 TABLET | Refills: 1 | Status: SHIPPED | OUTPATIENT
Start: 2019-03-25 | End: 2019-10-07 | Stop reason: SDUPTHER

## 2019-03-27 ENCOUNTER — OFFICE VISIT (OUTPATIENT)
Dept: FAMILY MEDICINE CLINIC | Age: 44
End: 2019-03-27
Payer: COMMERCIAL

## 2019-03-27 VITALS
RESPIRATION RATE: 18 BRPM | BODY MASS INDEX: 31.99 KG/M2 | OXYGEN SATURATION: 97 % | HEART RATE: 103 BPM | HEIGHT: 65 IN | DIASTOLIC BLOOD PRESSURE: 76 MMHG | TEMPERATURE: 98.9 F | WEIGHT: 192 LBS | SYSTOLIC BLOOD PRESSURE: 128 MMHG

## 2019-03-27 DIAGNOSIS — R00.0 TACHYCARDIA: ICD-10-CM

## 2019-03-27 DIAGNOSIS — F41.0 PANIC ATTACKS: ICD-10-CM

## 2019-03-27 DIAGNOSIS — C50.411 MALIGNANT NEOPLASM OF UPPER-OUTER QUADRANT OF RIGHT BREAST IN FEMALE, ESTROGEN RECEPTOR POSITIVE (HCC): ICD-10-CM

## 2019-03-27 DIAGNOSIS — F41.9 ANXIETY: ICD-10-CM

## 2019-03-27 DIAGNOSIS — R07.89 CHEST TIGHTNESS: Primary | ICD-10-CM

## 2019-03-27 DIAGNOSIS — Z17.0 MALIGNANT NEOPLASM OF UPPER-OUTER QUADRANT OF RIGHT BREAST IN FEMALE, ESTROGEN RECEPTOR POSITIVE (HCC): ICD-10-CM

## 2019-03-27 DIAGNOSIS — J06.9 VIRAL URI: ICD-10-CM

## 2019-03-27 PROCEDURE — 99213 OFFICE O/P EST LOW 20 MIN: CPT | Performed by: INTERNAL MEDICINE

## 2019-03-27 PROCEDURE — 93000 ELECTROCARDIOGRAM COMPLETE: CPT | Performed by: INTERNAL MEDICINE

## 2019-03-27 ASSESSMENT — ENCOUNTER SYMPTOMS
BACK PAIN: 1
BLOOD IN STOOL: 0
CONSTIPATION: 0
ABDOMINAL DISTENTION: 0
ORTHOPNEA: 0
CHEST TIGHTNESS: 1
VOMITING: 0
BOWEL INCONTINENCE: 0
EYE REDNESS: 1
ANAL BLEEDING: 0
SHORTNESS OF BREATH: 1
SPUTUM PRODUCTION: 0
COUGH: 1
CHOKING: 0
STRIDOR: 0
ABDOMINAL PAIN: 0
NAUSEA: 1
DIARRHEA: 0

## 2019-03-29 DIAGNOSIS — M62.81 MUSCLE WEAKNESS: ICD-10-CM

## 2019-03-29 RX ORDER — TIZANIDINE 4 MG/1
4 TABLET ORAL EVERY 8 HOURS PRN
Qty: 60 TABLET | Refills: 3 | Status: SHIPPED | OUTPATIENT
Start: 2019-03-29 | End: 2019-10-07 | Stop reason: SDUPTHER

## 2019-04-24 RX ORDER — DOXEPIN HYDROCHLORIDE 25 MG/1
25 CAPSULE ORAL NIGHTLY
Qty: 30 CAPSULE | Refills: 5 | Status: SHIPPED | OUTPATIENT
Start: 2019-04-24 | End: 2019-07-01 | Stop reason: SDUPTHER

## 2019-04-24 RX ORDER — DOXEPIN HYDROCHLORIDE 25 MG/1
CAPSULE ORAL
Qty: 30 CAPSULE | Refills: 2 | Status: SHIPPED | OUTPATIENT
Start: 2019-04-24 | End: 2019-04-24 | Stop reason: SDUPTHER

## 2019-07-01 RX ORDER — DOXEPIN HYDROCHLORIDE 25 MG/1
25 CAPSULE ORAL NIGHTLY
Qty: 30 CAPSULE | Refills: 5 | Status: SHIPPED | OUTPATIENT
Start: 2019-07-01 | End: 2020-01-03 | Stop reason: SDUPTHER

## 2019-07-13 ENCOUNTER — PATIENT MESSAGE (OUTPATIENT)
Dept: FAMILY MEDICINE CLINIC | Age: 44
End: 2019-07-13

## 2019-10-07 ENCOUNTER — OFFICE VISIT (OUTPATIENT)
Dept: FAMILY MEDICINE CLINIC | Age: 44
End: 2019-10-07
Payer: COMMERCIAL

## 2019-10-07 VITALS
DIASTOLIC BLOOD PRESSURE: 70 MMHG | WEIGHT: 190.2 LBS | HEIGHT: 65 IN | SYSTOLIC BLOOD PRESSURE: 122 MMHG | TEMPERATURE: 98.2 F | BODY MASS INDEX: 31.69 KG/M2 | RESPIRATION RATE: 12 BRPM | HEART RATE: 86 BPM | OXYGEN SATURATION: 98 %

## 2019-10-07 DIAGNOSIS — T88.7XXA MEDICATION SIDE EFFECT: Primary | ICD-10-CM

## 2019-10-07 DIAGNOSIS — M62.81 MUSCLE WEAKNESS: ICD-10-CM

## 2019-10-07 PROCEDURE — 99213 OFFICE O/P EST LOW 20 MIN: CPT | Performed by: INTERNAL MEDICINE

## 2019-10-07 RX ORDER — KETOROLAC TROMETHAMINE 10 MG/1
10 TABLET, FILM COATED ORAL EVERY 6 HOURS PRN
Qty: 20 TABLET | Refills: 1 | Status: SHIPPED | OUTPATIENT
Start: 2019-10-07 | End: 2019-11-13

## 2019-10-07 RX ORDER — TIZANIDINE 4 MG/1
4 TABLET ORAL EVERY 8 HOURS PRN
Qty: 60 TABLET | Refills: 3 | Status: SHIPPED | OUTPATIENT
Start: 2019-10-07 | End: 2020-03-13 | Stop reason: SDUPTHER

## 2019-11-13 ENCOUNTER — OFFICE VISIT (OUTPATIENT)
Dept: FAMILY MEDICINE CLINIC | Age: 44
End: 2019-11-13
Payer: COMMERCIAL

## 2019-11-13 VITALS
TEMPERATURE: 98.4 F | BODY MASS INDEX: 32.65 KG/M2 | HEIGHT: 65 IN | SYSTOLIC BLOOD PRESSURE: 132 MMHG | WEIGHT: 196 LBS | HEART RATE: 102 BPM | OXYGEN SATURATION: 98 % | DIASTOLIC BLOOD PRESSURE: 84 MMHG | RESPIRATION RATE: 20 BRPM

## 2019-11-13 DIAGNOSIS — G89.18 POST-OPERATIVE PAIN: Primary | ICD-10-CM

## 2019-11-13 DIAGNOSIS — Z90.49 HISTORY OF LAPAROSCOPIC CHOLECYSTECTOMY: ICD-10-CM

## 2019-11-13 DIAGNOSIS — Z17.0 MALIGNANT NEOPLASM OF UPPER-OUTER QUADRANT OF RIGHT BREAST IN FEMALE, ESTROGEN RECEPTOR POSITIVE (HCC): ICD-10-CM

## 2019-11-13 DIAGNOSIS — Z09 HOSPITAL DISCHARGE FOLLOW-UP: ICD-10-CM

## 2019-11-13 DIAGNOSIS — C50.411 MALIGNANT NEOPLASM OF UPPER-OUTER QUADRANT OF RIGHT BREAST IN FEMALE, ESTROGEN RECEPTOR POSITIVE (HCC): ICD-10-CM

## 2019-11-13 PROCEDURE — 99213 OFFICE O/P EST LOW 20 MIN: CPT | Performed by: INTERNAL MEDICINE

## 2019-11-13 RX ORDER — LANOLIN ALCOHOL/MO/W.PET/CERES
3 CREAM (GRAM) TOPICAL DAILY
COMMUNITY

## 2019-11-13 RX ORDER — AMOXICILLIN AND CLAVULANATE POTASSIUM 875; 125 MG/1; MG/1
1 TABLET, FILM COATED ORAL 2 TIMES DAILY
COMMUNITY
End: 2020-07-02

## 2019-11-13 RX ORDER — ACETAMINOPHEN,DIPHENHYDRAMINE HCL 500; 25 MG/1; MG/1
1 TABLET, FILM COATED ORAL NIGHTLY PRN
COMMUNITY

## 2019-11-13 RX ORDER — DOCUSATE SODIUM 100 MG/1
100 CAPSULE, LIQUID FILLED ORAL 2 TIMES DAILY
COMMUNITY
End: 2020-07-02

## 2019-11-13 RX ORDER — HYDROCODONE BITARTRATE AND ACETAMINOPHEN 5; 325 MG/1; MG/1
1 TABLET ORAL EVERY 6 HOURS PRN
COMMUNITY
End: 2019-11-13 | Stop reason: SDUPTHER

## 2019-11-13 RX ORDER — HYDROCODONE BITARTRATE AND ACETAMINOPHEN 5; 325 MG/1; MG/1
1 TABLET ORAL EVERY 6 HOURS PRN
Qty: 15 TABLET | Refills: 0 | Status: SHIPPED | OUTPATIENT
Start: 2019-11-13 | End: 2020-12-10 | Stop reason: SDUPTHER

## 2019-11-13 RX ORDER — ACETAMINOPHEN 325 MG/1
650 TABLET ORAL EVERY 6 HOURS PRN
COMMUNITY

## 2019-11-13 RX ORDER — TAMOXIFEN CITRATE 10 MG/1
20 TABLET ORAL DAILY
COMMUNITY
End: 2020-03-25 | Stop reason: SDUPTHER

## 2019-11-13 RX ORDER — DIPHENHYDRAMINE HCL 50 MG
50 CAPSULE ORAL EVERY 6 HOURS PRN
COMMUNITY

## 2019-11-13 ASSESSMENT — ENCOUNTER SYMPTOMS
ABDOMINAL PAIN: 1
SHORTNESS OF BREATH: 0
CHEST TIGHTNESS: 0
CHOKING: 0
ABDOMINAL DISTENTION: 0
CONSTIPATION: 0
ANAL BLEEDING: 0
WHEEZING: 0
DIARRHEA: 0
FLATUS: 0
VOMITING: 0
BLOOD IN STOOL: 0
HEMATOCHEZIA: 0
NAUSEA: 1
RECTAL PAIN: 0
BELCHING: 0
STRIDOR: 0

## 2019-11-13 ASSESSMENT — CROHNS DISEASE ACTIVITY INDEX (CDAI): CDAI SCORE: 0

## 2019-12-16 DIAGNOSIS — R00.0 TACHYCARDIA: Primary | ICD-10-CM

## 2019-12-18 LAB
ALBUMIN SERPL-MCNC: 4.1 G/DL
ALP BLD-CCNC: 66 U/L
ALT SERPL-CCNC: 12 U/L
ANION GAP SERPL CALCULATED.3IONS-SCNC: NORMAL MMOL/L
AST SERPL-CCNC: 14 U/L
BASOPHILS ABSOLUTE: NORMAL
BASOPHILS RELATIVE PERCENT: NORMAL
BILIRUB SERPL-MCNC: 0.3 MG/DL (ref 0.1–1.4)
BUN BLDV-MCNC: 8 MG/DL
CALCIUM SERPL-MCNC: 9.5 MG/DL
CHLORIDE BLD-SCNC: 107 MMOL/L
CO2: 27 MMOL/L
CREAT SERPL-MCNC: 0.92 MG/DL
EOSINOPHILS ABSOLUTE: NORMAL
EOSINOPHILS RELATIVE PERCENT: NORMAL
GFR CALCULATED: NORMAL
GLUCOSE BLD-MCNC: 92 MG/DL
HCT VFR BLD CALC: NORMAL %
HEMOGLOBIN: NORMAL
LYMPHOCYTES ABSOLUTE: NORMAL
LYMPHOCYTES RELATIVE PERCENT: NORMAL
MAGNESIUM: 2.3 MG/DL
MCH RBC QN AUTO: NORMAL PG
MCHC RBC AUTO-ENTMCNC: NORMAL G/DL
MCV RBC AUTO: NORMAL FL
MONOCYTES ABSOLUTE: NORMAL
MONOCYTES RELATIVE PERCENT: NORMAL
NEUTROPHILS ABSOLUTE: NORMAL
NEUTROPHILS RELATIVE PERCENT: NORMAL
PDW BLD-RTO: NORMAL %
PLATELET # BLD: NORMAL 10*3/UL
PMV BLD AUTO: NORMAL FL
POTASSIUM SERPL-SCNC: 4.7 MMOL/L
RBC # BLD: NORMAL 10*6/UL
SODIUM BLD-SCNC: 144 MMOL/L
TOTAL PROTEIN: 7.4
VITAMIN D 25-HYDROXY: 16.3
VITAMIN D2, 25 HYDROXY: NORMAL
VITAMIN D3,25 HYDROXY: NORMAL
WBC # BLD: NORMAL 10*3/UL

## 2019-12-20 DIAGNOSIS — R00.0 TACHYCARDIA: ICD-10-CM

## 2020-01-05 RX ORDER — DOXEPIN HYDROCHLORIDE 25 MG/1
25 CAPSULE ORAL NIGHTLY
Qty: 30 CAPSULE | Refills: 5 | Status: SHIPPED | OUTPATIENT
Start: 2020-01-05 | End: 2020-06-26 | Stop reason: SDUPTHER

## 2020-02-07 ENCOUNTER — TELEPHONE (OUTPATIENT)
Dept: ADMINISTRATIVE | Age: 45
End: 2020-02-07

## 2020-02-18 ENCOUNTER — NURSE TRIAGE (OUTPATIENT)
Dept: OTHER | Facility: CLINIC | Age: 45
End: 2020-02-18

## 2020-02-19 ENCOUNTER — PATIENT MESSAGE (OUTPATIENT)
Dept: FAMILY MEDICINE CLINIC | Age: 45
End: 2020-02-19

## 2020-02-19 ENCOUNTER — OFFICE VISIT (OUTPATIENT)
Dept: FAMILY MEDICINE CLINIC | Age: 45
End: 2020-02-19
Payer: COMMERCIAL

## 2020-02-19 VITALS
DIASTOLIC BLOOD PRESSURE: 80 MMHG | HEART RATE: 82 BPM | WEIGHT: 189 LBS | TEMPERATURE: 98.2 F | OXYGEN SATURATION: 99 % | HEIGHT: 65 IN | BODY MASS INDEX: 31.49 KG/M2 | SYSTOLIC BLOOD PRESSURE: 124 MMHG

## 2020-02-19 LAB
INFLUENZA A ANTIBODY: NEGATIVE
INFLUENZA B ANTIBODY: NEGATIVE

## 2020-02-19 PROCEDURE — 99214 OFFICE O/P EST MOD 30 MIN: CPT | Performed by: NURSE PRACTITIONER

## 2020-02-19 PROCEDURE — 87804 INFLUENZA ASSAY W/OPTIC: CPT | Performed by: NURSE PRACTITIONER

## 2020-02-19 RX ORDER — DOXYCYCLINE HYCLATE 100 MG
100 TABLET ORAL 2 TIMES DAILY
Qty: 14 TABLET | Refills: 0 | Status: SHIPPED | OUTPATIENT
Start: 2020-02-19 | End: 2020-02-26

## 2020-02-19 ASSESSMENT — PATIENT HEALTH QUESTIONNAIRE - PHQ9
2. FEELING DOWN, DEPRESSED OR HOPELESS: 0
1. LITTLE INTEREST OR PLEASURE IN DOING THINGS: 0
SUM OF ALL RESPONSES TO PHQ9 QUESTIONS 1 & 2: 0
SUM OF ALL RESPONSES TO PHQ QUESTIONS 1-9: 0
SUM OF ALL RESPONSES TO PHQ QUESTIONS 1-9: 0

## 2020-02-19 ASSESSMENT — ENCOUNTER SYMPTOMS
RHINORRHEA: 0
TROUBLE SWALLOWING: 0
CHEST TIGHTNESS: 1
SORE THROAT: 0
SHORTNESS OF BREATH: 0
SINUS PRESSURE: 0
ABDOMINAL PAIN: 0
WHEEZING: 0
COUGH: 1

## 2020-02-19 NOTE — PROGRESS NOTES
[address]  [place], [address]  [phone]      Justine Bellamy is a 45 y.o. female who presents today for her  medicalconditions/complaints as noted below. Justine Bellamy is c/o of Cough (started yesterday,102 this morning,used tylenol this morning than motrin 2 hrs after that,having congestion,drainage,headache. no traveling outside country. symptoms similar to last time but hit her faster. on tamoxifen,gets secondary infections easily in breast/cellulits)  . HPI:    Pt was on last abx 1 month ago for breast infection. She is currently on tamioxifen for recent breast cancer/mastectomy. 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 Cough   This is a new problem. The current episode started yesterday. The problem has been waxing and waning. The problem occurs every few minutes. The cough is non-productive. Associated symptoms include chest pain, chills, a fever (101-102), myalgias and sweats. Pertinent negatives include no ear congestion, ear pain, headaches, nasal congestion, postnasal drip, rhinorrhea, sore throat, shortness of breath or wheezing. The symptoms are aggravated by lying down, exercise and cold air. She has tried rest, cool air and body position changes for the symptoms. The treatment provided no relief. There is no history of pneumonia.        Past Medical History:   Diagnosis Date    Breast cancer ([address]     right    Lumbar herniated disc     Polyradiculopathy 2015      Past Surgical History:   Procedure Laterality Date    APPENDECTOMY  2005    CHOLECYSTECTOMY  11/2019    MASTECTOMY, BILATERAL  07/2019    with expanders     Family History   Problem Relation Age of Onset    No Known Problems Mother     Diabetes Father     High Blood Pressure Father     No Known Problems Sister     No Known Problems Brother     Cancer Maternal Grandmother     Alcohol Abuse Maternal Grandfather     Diabetes Paternal Grandmother     Heart Disease Paternal Grandmother  High Cholesterol Paternal Grandmother     Stroke Paternal Grandmother     Diabetes Paternal Grandfather     Heart Disease Paternal Grandfather     High Cholesterol Paternal Grandfather      Social History     Tobacco Use    Smoking status: Never Smoker    Smokeless tobacco: Never Used   Substance Use Topics    Alcohol use: No      Current Outpatient Medications   Medication Sig Dispense Refill    doxycycline hyclate (VIBRA-TABS) 100 MG tablet Take 1 tablet by mouth 2 times daily for 7 days 14 tablet 0    doxepin (SINEQUAN) 25 MG capsule Take 1 capsule by mouth nightly 30 capsule 5    amoxicillin-clavulanate (AUGMENTIN) 875-125 MG per tablet Take 1 tablet by mouth 2 times daily      acetaminophen (TYLENOL) 325 MG tablet Take 650 mg by mouth every 6 hours as needed for Pain      docusate sodium (COLACE) 100 MG capsule Take 100 mg by mouth 2 times daily      diphenhydrAMINE (BENADRYL) 50 MG capsule Take 50 mg by mouth every 6 hours as needed for Itching      melatonin 3 MG TABS tablet Take 3 mg by mouth daily      tamoxifen (NOLVADEX) 10 MG tablet Take 20 mg by mouth daily      diphenhydrAMINE-APAP, sleep, (TYLENOL PM EXTRA STRENGTH)  MG tablet Take 1 tablet by mouth nightly as needed for Sleep      tiZANidine (ZANAFLEX) 4 MG tablet Take 1 tablet by mouth every 8 hours as needed (muscle spasm) 60 tablet 3    ondansetron (ZOFRAN-ODT) 4 MG disintegrating tablet Take 4 mg by mouth       No current facility-administered medications for this visit.       Allergies   Allergen Reactions    Benzoyl Peroxide Swelling    Sulfamethoxazole-Trimethoprim Rash    Benzyl Alcohol Swelling     Eye lids swell    Hydromorphone      Bugs crawling on skin       Health Maintenance   Topic Date Due    DTaP/Tdap/Td vaccine (1 - Tdap) 09/30/1988    HIV screen  09/30/1992    Cervical cancer screen  09/30/1998    Lipid screen  09/30/2017    Flu vaccine (1) 02/19/2021 (Originally 9/1/2019)    Shingles Vaccine (1 of 2) 09/30/2027    Hepatitis A vaccine  Aged Out    Hepatitis B vaccine  Aged Out    Hib vaccine  Aged Out    Meningococcal (ACWY) vaccine  Aged Out    Pneumococcal 0-64 years Vaccine  Aged Out       Subjective:      Review of Systems   Constitutional: Positive for activity change, appetite change, chills, fatigue and fever (101-102). Negative for diaphoresis and unexpected weight change. HENT: Negative for congestion, ear discharge, ear pain, hearing loss, postnasal drip, rhinorrhea, sinus pressure, sneezing, sore throat and trouble swallowing. Respiratory: Positive for cough and chest tightness. Negative for shortness of breath and wheezing. Cardiovascular: Positive for chest pain. Negative for palpitations. Gastrointestinal: Negative for abdominal pain. Musculoskeletal: Positive for myalgias. Allergic/Immunologic: Positive for immunocompromised state. Neurological: Negative for dizziness, weakness and headaches. Hematological: Negative for adenopathy. Psychiatric/Behavioral: Positive for sleep disturbance. Objective:      Physical Exam  Vitals signs and nursing note reviewed. Constitutional:       General: She is not in acute distress. Appearance: Normal appearance. She is obese. She is not ill-appearing, toxic-appearing or diaphoretic. HENT:      Head: Normocephalic and atraumatic. Neck:      Musculoskeletal: Neck supple. Cardiovascular:      Rate and Rhythm: Normal rate and regular rhythm. Pulses: Normal pulses. Heart sounds: Normal heart sounds. Pulmonary:      Effort: Pulmonary effort is normal. No respiratory distress. Breath sounds: Normal breath sounds. No stridor. No rhonchi. Skin:     General: Skin is warm and dry. Neurological:      General: No focal deficit present. Mental Status: She is alert and oriented to person, place, and time.    Psychiatric:         Mood and Affect: Mood normal.         Behavior: Behavior normal.

## 2020-02-19 NOTE — TELEPHONE ENCOUNTER
From: Meche Romero  To: Amy Escamilla, ALVAREZ - CNP  Sent: 2/19/2020 6:12 PM EST  Subject: Visit Follow-Up Question    Hi! I forgot to ask you for diflucan to go along with the antibiotic. Could you call that in please?     Thanks     Joseph Alejandro

## 2020-02-20 RX ORDER — FLUCONAZOLE 100 MG/1
100 TABLET ORAL DAILY
Qty: 3 TABLET | Refills: 0 | Status: SHIPPED | OUTPATIENT
Start: 2020-02-20 | End: 2020-02-23

## 2020-03-14 RX ORDER — TIZANIDINE 4 MG/1
4 TABLET ORAL EVERY 8 HOURS PRN
Qty: 60 TABLET | Refills: 3 | Status: SHIPPED | OUTPATIENT
Start: 2020-03-14 | End: 2020-07-30 | Stop reason: SDUPTHER

## 2020-03-25 RX ORDER — TAMOXIFEN CITRATE 20 MG/1
20 TABLET ORAL DAILY
Qty: 30 TABLET | Refills: 3 | Status: SHIPPED | OUTPATIENT
Start: 2020-03-25 | End: 2020-11-29 | Stop reason: SDUPTHER

## 2020-03-25 RX ORDER — LORAZEPAM 0.5 MG/1
0.5 TABLET ORAL EVERY 8 HOURS PRN
Qty: 30 TABLET | Refills: 0 | Status: SHIPPED | OUTPATIENT
Start: 2020-03-25 | End: 2020-07-02 | Stop reason: SDUPTHER

## 2020-03-27 ENCOUNTER — TELEPHONE (OUTPATIENT)
Dept: FAMILY MEDICINE CLINIC | Age: 45
End: 2020-03-27

## 2020-04-01 RX ORDER — AZITHROMYCIN 250 MG/1
250 TABLET, FILM COATED ORAL SEE ADMIN INSTRUCTIONS
Qty: 6 TABLET | Refills: 0 | Status: SHIPPED | OUTPATIENT
Start: 2020-04-01 | End: 2020-04-06

## 2020-04-13 ENCOUNTER — TELEPHONE (OUTPATIENT)
Dept: FAMILY MEDICINE CLINIC | Age: 45
End: 2020-04-13

## 2020-05-21 ENCOUNTER — TELEPHONE (OUTPATIENT)
Dept: FAMILY MEDICINE CLINIC | Age: 45
End: 2020-05-21

## 2020-06-26 RX ORDER — DOXEPIN HYDROCHLORIDE 25 MG/1
25 CAPSULE ORAL NIGHTLY
Qty: 30 CAPSULE | Refills: 5 | Status: SHIPPED | OUTPATIENT
Start: 2020-06-26 | End: 2020-07-02 | Stop reason: SDUPTHER

## 2020-07-02 ENCOUNTER — OFFICE VISIT (OUTPATIENT)
Dept: FAMILY MEDICINE CLINIC | Age: 45
End: 2020-07-02
Payer: COMMERCIAL

## 2020-07-02 VITALS
OXYGEN SATURATION: 96 % | SYSTOLIC BLOOD PRESSURE: 110 MMHG | WEIGHT: 194 LBS | HEIGHT: 65 IN | BODY MASS INDEX: 32.32 KG/M2 | TEMPERATURE: 97 F | RESPIRATION RATE: 16 BRPM | DIASTOLIC BLOOD PRESSURE: 66 MMHG | HEART RATE: 87 BPM

## 2020-07-02 PROCEDURE — 99213 OFFICE O/P EST LOW 20 MIN: CPT | Performed by: INTERNAL MEDICINE

## 2020-07-02 RX ORDER — LORAZEPAM 0.5 MG/1
0.5 TABLET ORAL EVERY 8 HOURS PRN
Qty: 30 TABLET | Refills: 0 | Status: SHIPPED | OUTPATIENT
Start: 2020-07-02 | End: 2021-03-02 | Stop reason: SDUPTHER

## 2020-07-02 RX ORDER — DOXEPIN HYDROCHLORIDE 75 MG/1
75 CAPSULE ORAL NIGHTLY
Qty: 30 CAPSULE | Refills: 3 | Status: SHIPPED | OUTPATIENT
Start: 2020-07-02 | End: 2020-11-29 | Stop reason: SDUPTHER

## 2020-07-02 RX ORDER — LORAZEPAM 0.5 MG/1
0.5 TABLET ORAL EVERY 8 HOURS PRN
Qty: 30 TABLET | Refills: 0 | Status: CANCELLED | OUTPATIENT
Start: 2020-07-02 | End: 2020-08-01

## 2020-07-02 ASSESSMENT — ENCOUNTER SYMPTOMS
ANAL BLEEDING: 0
VISUAL CHANGE: 0
COUGH: 0
BLOOD IN STOOL: 0
CHOKING: 0
CONSTIPATION: 0
CHEST TIGHTNESS: 0
SORE THROAT: 0
RECTAL PAIN: 0
DIARRHEA: 0
VOMITING: 0
NAUSEA: 0
WHEEZING: 0
ABDOMINAL PAIN: 0
SHORTNESS OF BREATH: 0
STRIDOR: 0

## 2020-07-02 NOTE — PROGRESS NOTES
[address] [facility]  [address] [facility] [address] [facility] [facility] [address] [address]  Dept: [phone]  Dept Fax: [phone]    Edwina Charles a 45 y.o. female who presents today for her medical conditions/complaints as notedbelow. Brayan Greeen is c/o of   Chief Complaint   Patient presents with    Insomnia     pt stated she can't sleep          HPI:     Not sleeping well   Anxiety is worsened as well   Doxepin was working very well up until a few weeks ago   Does have more stress recently  Completed her breast surgery at [facility] may 8th and then had an infection /cellulitis afterward , is mostly cleared up though now  Has been off work since march   The more tired she is the more anxious she becomes and gets the panic attack symptoms of heart racing , sob , achy         Fatigue   This is a recurrent problem. The current episode started 1 to 4 weeks ago. The problem occurs constantly. The problem has been gradually worsening. Associated symptoms include arthralgias, fatigue and myalgias. Pertinent negatives include no abdominal pain, chest pain, chills, congestion, coughing, diaphoresis, fever, headaches, nausea, rash, sore throat, urinary symptoms, vertigo, visual change, vomiting or weakness. The symptoms are aggravated by exertion, walking and stress. She has tried rest, sleep and walking for the symptoms. The treatment provided mild relief.        No results found for: LABA1C      ( goal A1C is < 7)   No results found for: LABMICR  No results found for: LDLCHOLESTEROL, LDLCALC    (goal LDL is <100)   AST (U/L)   Date Value   12/18/2019 14     ALT (U/L)   Date Value   12/18/2019 12     BUN (mg/dL)   Date Value   12/18/2019 8     BP Readings from Last 3 Encounters:   07/02/20 110/66   02/19/20 124/80   11/13/19 132/84          (goal 120/80)    Past Medical History:   Diagnosis Date    Breast cancer ([address]     right    Lumbar herniated disc     Polyradiculopathy 2015      Past Surgical History:   Procedure Laterality Date    APPENDECTOMY  2005    CHOLECYSTECTOMY  11/2019    MASTECTOMY, BILATERAL  07/2019    with expanders       Family History   Problem Relation Age of Onset    No Known Problems Mother     Diabetes Father     High Blood Pressure Father     No Known Problems Sister     No Known Problems Brother     Cancer Maternal Grandmother     Alcohol Abuse Maternal Grandfather     Diabetes Paternal Grandmother     Heart Disease Paternal Grandmother     High Cholesterol Paternal Grandmother     Stroke Paternal Grandmother     Diabetes Paternal Grandfather     Heart Disease Paternal Grandfather     High Cholesterol Paternal Grandfather        Social History     Tobacco Use    Smoking status: Never Smoker    Smokeless tobacco: Never Used   Substance Use Topics    Alcohol use: No      Current Outpatient Medications   Medication Sig Dispense Refill    doxepin (SINEQUAN) 75 MG capsule Take 1 capsule by mouth nightly 30 capsule 3    LORazepam (ATIVAN) 0.5 MG tablet Take 1 tablet by mouth every 8 hours as needed for Anxiety for up to 30 days. 30 tablet 0    tamoxifen (NOLVADEX) 20 MG tablet Take 1 tablet by mouth daily 30 tablet 3    tiZANidine (ZANAFLEX) 4 MG tablet Take 1 tablet by mouth every 8 hours as needed (muscle spasm) 60 tablet 3    acetaminophen (TYLENOL) 325 MG tablet Take 650 mg by mouth every 6 hours as needed for Pain      diphenhydrAMINE (BENADRYL) 50 MG capsule Take 50 mg by mouth every 6 hours as needed for Itching      melatonin 3 MG TABS tablet Take 3 mg by mouth daily      diphenhydrAMINE-APAP, sleep, (TYLENOL PM EXTRA STRENGTH)  MG tablet Take 1 tablet by mouth nightly as needed for Sleep       No current facility-administered medications for this visit.       Allergies   Allergen Reactions    Benzoyl Peroxide Swelling    Sulfamethoxazole-Trimethoprim Rash    Benzyl Alcohol Swelling     Eye lids swell    Hydromorphone      Bugs crawling on skin          Health Maintenance   Topic Date Due    HIV screen  09/30/1992    DTaP/Tdap/Td vaccine (1 - Tdap) 09/30/1996    Cervical cancer screen  09/30/1998    Lipid screen  09/30/2017    Flu vaccine (1) 09/01/2020    Hepatitis A vaccine  Aged Out    Hepatitis B vaccine  Aged Out    Hib vaccine  Aged Out    Meningococcal (ACWY) vaccine  Aged Out    Pneumococcal 0-64 years Vaccine  Aged Out       Subjective:     Review of Systems   Constitutional: Positive for activity change and fatigue. Negative for appetite change, chills, diaphoresis, fever and unexpected weight change. HENT: Negative for congestion and sore throat. Eyes: Negative for visual disturbance. Respiratory: Negative for cough, choking, chest tightness, shortness of breath, wheezing and stridor. Cardiovascular: Negative for chest pain, palpitations and leg swelling. Gastrointestinal: Negative for abdominal pain, anal bleeding, blood in stool, constipation, diarrhea, nausea, rectal pain and vomiting. Musculoskeletal: Positive for arthralgias and myalgias. Skin: Negative for rash. Neurological: Negative for vertigo, weakness and headaches. Psychiatric/Behavioral: Positive for sleep disturbance. Negative for decreased concentration, dysphoric mood, hallucinations, self-injury and suicidal ideas. The patient is nervous/anxious. The patient is not hyperactive. Objective:      Physical Exam  Constitutional:       General: She is not in acute distress. Appearance: She is well-developed. She is obese. She is not ill-appearing, toxic-appearing or diaphoretic. HENT:      Head: Normocephalic and atraumatic. Eyes:      Pupils: Pupils are equal, round, and reactive to light. Cardiovascular:      Rate and Rhythm: Normal rate and regular rhythm. Pulses: Normal pulses. Heart sounds: Normal heart sounds. No murmur. No gallop. Pulmonary:      Effort: Pulmonary effort is normal. No respiratory distress. Breath sounds: Normal breath sounds. No stridor. No wheezing, rhonchi or rales. Chest:      Chest wall: No tenderness. Abdominal:      Palpations: There is no hepatomegaly or splenomegaly. Skin:     General: Skin is warm and dry. Capillary Refill: Capillary refill takes less than 2 seconds. Coloration: Skin is pale. Skin is not jaundiced. Findings: No bruising, erythema or rash. Neurological:      General: No focal deficit present. Mental Status: She is alert and oriented to person, place, and time. Cranial Nerves: No cranial nerve deficit. /66 (Site: Left Upper Arm, Position: Sitting, Cuff Size: Medium Adult)   Pulse 87   Temp 97 °F (36.1 °C) (Tympanic)   Resp 16   Ht 5' 5\" (1.651 m)   Wt 194 lb (88 kg)   LMP 05/02/2020 (Exact Date)   SpO2 96%   BMI 32.28 kg/m²     Assessment:       Diagnosis Orders   1. Sleep disturbance     2. Primary insomnia     3. Other fatigue  CBC Auto Differential    Comprehensive Metabolic Panel    TSH with Reflex    Sedimentation Rate    Vitamin B12 & Folate   4. Anxiety  LORazepam (ATIVAN) 0.5 MG tablet   5. Panic attacks  LORazepam (ATIVAN) 0.5 MG tablet   6. Malignant neoplasm of upper-outer quadrant of right breast in female, estrogen receptor positive (HCC)  LORazepam (ATIVAN) 0.5 MG tablet   7. Exposure to COVID-19 virus  Covid-19, Antibody, Total             Plan:       Return if symptoms worsen or fail to improve.     Orders Placed This Encounter   Procedures    CBC Auto Differential     Standing Status:   Future     Standing Expiration Date:   7/2/2021    Comprehensive Metabolic Panel     Standing Status:   Future     Standing Expiration Date:   7/2/2021    TSH with Reflex     Standing Status:   Future     Standing Expiration Date:   7/2/2021    Sedimentation Rate     Standing Status:   Future     Standing Expiration Date:   7/2/2021    Vitamin B12 & Folate     Standing Status:   Future     Standing Expiration Date: 7/2/2021    Covid-19, Antibody, Total     Standing Status:   Future     Standing Expiration Date:   7/2/2021     Scheduling Instructions:      CDC does not recommend using antibody testing to diagnose acute infection. It is recommended to use a viral antigen test to diagnose acute infection. (COVID-19 EAP [id number])            Antibody tests are not 100% accurate, and some false-positive results or false-negative results may occur. A positive result may not ensure immunity from reinfection. Per CDC, positive or negative results do not confirm whether a patient is able to spread the virus that causes COVID-19     Orders Placed This Encounter   Medications    doxepin (SINEQUAN) 75 MG capsule     Sig: Take 1 capsule by mouth nightly     Dispense:  30 capsule     Refill:  3    LORazepam (ATIVAN) 0.5 MG tablet     Sig: Take 1 tablet by mouth every 8 hours as needed for Anxiety for up to 30 days. Dispense:  30 tablet     Refill:  0    Check blood work to r/o other causes since it has been over 6 months since she has any  Is doing vitamin D daily     Will increase doxepin to 75 mg once daily /nightly for sleep . Also given small script for ativan for severe panic attacks to use sparingly. Controlled substances monitoring: possible medication side effects, risk of tolerance and/or dependence, and alternative treatments discussed, no signs of potential drug abuse or diversion identified and [place] report reviewed today- activity consistent with treatment plan     F/u in 4-6 weeks fo rmed check   Call with q/c   . Patientgiven educational materials - see patient instructions. Discussed use, benefit,and side effects of prescribed medications. All patient questions answered. Ptvoiced understanding. Reviewed health maintenance. Instructed to continue currentmedications, diet and exercise. Patient agreed with treatment plan. Follow up asdirected.      Electronically signed by Jenae Moran DO on 7/2/2020 at 10:57 AM

## 2020-07-02 NOTE — PROGRESS NOTES
Visit Information    Have you changed or started any medications since your last visit including any over-the-counter medicines, vitamins, or herbal medicines? no   Are you having any side effects from any of your medications? -  no  Have you stopped taking any of your medications? Is so, why? -  no    Have you seen any other physician or provider since your last visit? No  Have you had any other diagnostic tests since your last visit? No  Have you been seen in the emergency room and/or had an admission to a hospital since we last saw you? No  Have you had your routine dental cleaning in the past 6 months? no    Have you activated your [employer] account? If not, what are your barriers?  Yes     Patient Care Team:  Taylor Acevedo DO as PCP - General (Family Medicine)  Taylor Acevedo DO as PCP - [facility] Provider    Medical History Review  Past Medical, Family, and Social History reviewed and does contribute to the patient presenting condition    Health Maintenance   Topic Date Due    HIV screen  09/30/1992    DTaP/Tdap/Td vaccine (1 - Tdap) 09/30/1996    Cervical cancer screen  09/30/1998    Lipid screen  09/30/2017    Flu vaccine (1) 09/01/2020    Hepatitis A vaccine  Aged Out    Hepatitis B vaccine  Aged Out    Hib vaccine  Aged Out    Meningococcal (ACWY) vaccine  Aged Out    Pneumococcal 0-64 years Vaccine  Aged Out

## 2020-07-30 RX ORDER — TIZANIDINE 4 MG/1
4 TABLET ORAL EVERY 8 HOURS PRN
Qty: 60 TABLET | Refills: 3 | Status: SHIPPED | OUTPATIENT
Start: 2020-07-30 | End: 2020-12-22 | Stop reason: SDUPTHER

## 2020-11-05 ENCOUNTER — OFFICE VISIT (OUTPATIENT)
Dept: FAMILY MEDICINE CLINIC | Age: 45
End: 2020-11-05
Payer: COMMERCIAL

## 2020-11-05 VITALS
RESPIRATION RATE: 18 BRPM | BODY MASS INDEX: 32.99 KG/M2 | HEART RATE: 87 BPM | DIASTOLIC BLOOD PRESSURE: 78 MMHG | HEIGHT: 65 IN | TEMPERATURE: 97.5 F | OXYGEN SATURATION: 97 % | WEIGHT: 198 LBS | SYSTOLIC BLOOD PRESSURE: 122 MMHG

## 2020-11-05 PROCEDURE — 99214 OFFICE O/P EST MOD 30 MIN: CPT | Performed by: INTERNAL MEDICINE

## 2020-11-05 RX ORDER — GABAPENTIN 300 MG/1
300 CAPSULE ORAL 2 TIMES DAILY PRN
Qty: 60 CAPSULE | Refills: 1 | Status: SHIPPED | OUTPATIENT
Start: 2020-11-05 | End: 2021-01-22 | Stop reason: SDUPTHER

## 2020-11-05 ASSESSMENT — ENCOUNTER SYMPTOMS
COUGH: 0
VOMITING: 0
DIARRHEA: 0
BACK PAIN: 0
BLOOD IN STOOL: 0
CHEST TIGHTNESS: 1
CONSTIPATION: 0
STRIDOR: 0
WHEEZING: 0
CHOKING: 0
RECTAL PAIN: 0
NAUSEA: 0
COLOR CHANGE: 0
ABDOMINAL PAIN: 0
SHORTNESS OF BREATH: 0
ABDOMINAL DISTENTION: 0
ANAL BLEEDING: 0

## 2020-11-05 NOTE — PROGRESS NOTES
Visit Information    Have you changed or started any medications since your last visit including any over-the-counter medicines, vitamins, or herbal medicines? no   Are you having any side effects from any of your medications? -  no  Have you stopped taking any of your medications? Is so, why? -  no    Have you seen any other physician or provider since your last visit? No  Have you had any other diagnostic tests since your last visit? No  Have you been seen in the emergency room and/or had an admission to a hospital since we last saw you? No  Have you had your routine dental cleaning in the past 6 months? no    Have you activated your [employer] account? If not, what are your barriers?  Yes     Patient Care Team:  Jarrett Winters DO as PCP - General (Family Medicine)  Jarrett Winters DO as PCP - [facility] Provider    Medical History Review  Past Medical, Family, and Social History reviewed and does contribute to the patient presenting condition    Health Maintenance   Topic Date Due    HIV screen  09/30/1992    DTaP/Tdap/Td vaccine (1 - Tdap) 09/30/1996    Cervical cancer screen  09/30/1998    Lipid screen  09/30/2017    Flu vaccine (1) 09/01/2020    Hepatitis A vaccine  Aged Out    Hepatitis B vaccine  Aged Out    Hib vaccine  Aged Out    Meningococcal (ACWY) vaccine  Aged Out    Pneumococcal 0-64 years Vaccine  Aged Out

## 2020-11-05 NOTE — PROGRESS NOTES
[address] [facility]  [address] [address]  Dept: [phone]  Dept Fax: [phone]    Javier Linares a 39 y.o. female who presents today for her medical conditions/complaints as notedbelow. Kali Benjaminlindseykaran is c/o of   Chief Complaint   Patient presents with    Swelling     on left side of chest wall- near were port was. noticed it about 2 weeks ago.  has went down some    Anxiety         HPI:     Here for left upper chest wall swelling and pain   Has been there about  2 weeks   Feels it actually might be a little better than before   Is where the site of her port used to be   She has hx of malignant breast cancer in situ dxed 2 yrs ago around this time   Had b/l mastectomy and just recently had implants /reconstruciton sx about 9 months ago   Also under went chemo   She is on tamoxifen   No enlarged lymph nodes noted   No cp or sob   No change to medications   No f/c     She is asking about low dose prn gabapentin due to some nerve pain /tingling to left three toes and right upper thigh that happens from time to time but bother some and se form chemo   No specific trigger except touch or with toes when she is walking     She is also asking about doxepin and tamoxifen interaction   Although this is more related to ssri and doxepin is a tca   No actual issue with anxiety    Also has had \"pitting edema \" per patient   She feels it is 2 + after being on her feet /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    She still has indents from socks she took off 3 hours ago   No real chest pain or sob  Did have covid a few months ago so worried about cardiac issues and edema from this   Does not wear compression stockings  Does stand at work for basically whole shift  No regular exercise   No hx of heart disease   Hr is on the higer side though even when not active ; monitors with apple watch              No results found for: LABA1C      ( goal A1C is < 7)   No results found for: LABMICR  No results found for: LDLCHOLESTEROL, LDLCALC    (goal LDL is <100)   AST (U/L)   Date Value   12/18/2019 14     ALT (U/L)   Date Value   12/18/2019 12     BUN (mg/dL)   Date Value   12/18/2019 8     BP Readings from Last 3 Encounters:   11/05/20 122/78   07/02/20 110/66   02/19/20 124/80          (goal 120/80)    Past Medical History:   Diagnosis Date    Breast cancer ([facility] [address]     right    Lumbar herniated disc     Polyradiculopathy 2015      Past Surgical History:   Procedure Laterality Date    APPENDECTOMY  2005    CHOLECYSTECTOMY  11/2019    MASTECTOMY, BILATERAL  07/2019    with expanders       Family History   Problem Relation Age of Onset    No Known Problems Mother     Diabetes Father     High Blood Pressure Father     No Known Problems Sister     No Known Problems Brother     Cancer Maternal Grandmother     Alcohol Abuse Maternal Grandfather     Diabetes Paternal Grandmother     Heart Disease Paternal Grandmother     High Cholesterol Paternal Grandmother     Stroke Paternal Grandmother     Diabetes Paternal Grandfather     Heart Disease Paternal Grandfather     High Cholesterol Paternal Grandfather        Social History     Tobacco Use    Smoking status: Never Smoker    Smokeless tobacco: Never Used   Substance Use Topics    Alcohol use: No      Current Outpatient Medications   Medication Sig Dispense Refill    gabapentin (NEURONTIN) 300 MG capsule Take 1 capsule by mouth 2 times daily as needed (nerve pain) for up to 180 days.  Intended supply: 90 days 60 capsule 1    tiZANidine (ZANAFLEX) 4 MG tablet Take 1 tablet by mouth every 8 hours as needed (muscle spasm) 60 tablet 3    doxepin (SINEQUAN) 75 MG capsule Take 1 capsule by mouth nightly 30 capsule 3    tamoxifen (NOLVADEX) 20 MG tablet Take 1 tablet by mouth daily 30 tablet 3    acetaminophen (TYLENOL) 325 MG tablet Take 650 mg by mouth every 6 hours as needed for Pain      diphenhydrAMINE (BENADRYL) 50 MG capsule Take 50 mg by mouth every 6 hours as needed for Itching      diphenhydrAMINE-APAP, sleep, (TYLENOL PM EXTRA STRENGTH)  MG tablet Take 1 tablet by mouth nightly as needed for Sleep      melatonin 3 MG TABS tablet Take 3 mg by mouth daily       No current facility-administered medications for this visit. Allergies   Allergen Reactions    Benzoyl Peroxide Swelling    Sulfamethoxazole-Trimethoprim Rash    Benzyl Alcohol Swelling     Eye lids swell    Hydromorphone      Bugs crawling on skin          Health Maintenance   Topic Date Due    HIV screen  09/30/1992    DTaP/Tdap/Td vaccine (1 - Tdap) 09/30/1996    Cervical cancer screen  09/30/1998    Lipid screen  09/30/2017    Flu vaccine (1) 09/01/2020    Hepatitis A vaccine  Aged Out    Hepatitis B vaccine  Aged Out    Hib vaccine  Aged Out    Meningococcal (ACWY) vaccine  Aged Out    Pneumococcal 0-64 years Vaccine  Aged Out       Subjective:     Review of Systems   Constitutional: Positive for activity change. Negative for appetite change, chills, fatigue, fever and unexpected weight change. Eyes: Negative for visual disturbance. Respiratory: Positive for chest tightness. Negative for cough, choking, shortness of breath, wheezing and stridor. Cardiovascular: Positive for palpitations and leg swelling. Negative for chest pain. Gastrointestinal: Negative for abdominal distention, abdominal pain, anal bleeding, blood in stool, constipation, diarrhea, nausea, rectal pain and vomiting. Endocrine: Negative for cold intolerance, heat intolerance and polydipsia. Genitourinary: Negative for difficulty urinating. Musculoskeletal: Negative for arthralgias, back pain and gait problem. Skin: Positive for wound. Negative for color change, pallor and rash. Allergic/Immunologic: Positive for immunocompromised state. Neurological: Positive for numbness. Negative for dizziness, light-headedness and headaches.    Hematological: shoulder: She exhibits no tenderness, no bony tenderness, no pain, no spasm, normal pulse and normal strength. Left knee: She exhibits normal range of motion, no swelling, no effusion, no ecchymosis, no deformity, no laceration and no erythema. No tenderness found. Lumbar back: She exhibits spasm. She exhibits normal range of motion, no tenderness, no bony tenderness, no swelling, no edema, no deformity, no pain and normal pulse. Right lower leg: No edema. Left lower leg: No edema. Lymphadenopathy:      Cervical: No cervical adenopathy. Upper Body:      Right upper body: No supraclavicular, axillary or pectoral adenopathy. Left upper body: No supraclavicular, axillary or pectoral adenopathy. Skin:     General: Skin is warm and dry. Capillary Refill: Capillary refill takes less than 2 seconds. Findings: No rash. Neurological:      General: No focal deficit present. Mental Status: She is alert and oriented to person, place, and time. Cranial Nerves: Cranial nerves are intact. No cranial nerve deficit. Sensory: Sensory deficit present. Motor: Motor function is intact. No atrophy or abnormal muscle tone. Coordination: Coordination is intact. Gait: Gait normal.   Psychiatric:         Attention and Perception: Attention normal.         Mood and Affect: Mood is anxious. Speech: Speech normal.         Behavior: Behavior normal.         Thought Content: Thought content normal.         Judgment: Judgment normal.       /78   Pulse 87   Temp 97.5 °F (36.4 °C) (Temporal)   Resp 18   Ht 5' 5\" (1.651 m)   Wt 198 lb (89.8 kg)   SpO2 97%   BMI 32.95 kg/m²     Assessment:       Diagnosis Orders   1. Chest wall mass  CT CHEST W CONTRAST   2. Screening for hyperlipidemia     3. Abrasion of left breast, initial encounter  CT CHEST W CONTRAST   4.  Malignant neoplasm of right female breast, unspecified estrogen receptor status, unspecified site of breast [facility])  CT CHEST W CONTRAST   5. History of breast cancer  CT CHEST W CONTRAST   6. Neuropathy  gabapentin (NEURONTIN) 300 MG capsule   7. Pitting edema  Echocardiogram complete   8. Tachycardia  Echocardiogram complete             Plan:       Return if symptoms worsen or fail to improve. Orders Placed This Encounter   Procedures    CT CHEST W CONTRAST     Standing Status:   Future     Standing Expiration Date:   11/5/2021    Echocardiogram complete     Standing Status:   Future     Standing Expiration Date:   1/4/2021     Order Specific Question:   Reason for exam:     Answer:   tachy, covid hx , pitting edema     Orders Placed This Encounter   Medications    gabapentin (NEURONTIN) 300 MG capsule     Sig: Take 1 capsule by mouth 2 times daily as needed (nerve pain) for up to 180 days. Intended supply: 90 days     Dispense:  60 capsule     Refill:  1    check ct chest   Pt prefers not to do mri   Wants to change oncologist as well   Was going up to [place] due ot insurance  wants testing done there though   Recommended dr Darvin Devlin locally if insurance allows  Will do ct chest due to some abnormal ites noted on exam   Does have one further reconstruction surgery due     Low dose gabapentin 1-2 times daily as needed for nerve pain /numbness due to chemo side effect  Reviewed SE. Pt verbalized understanding. Will check echo also in regards to edema  Although has no pitting edema today   Mild non pitting edema  Hx of tachycardia and workup and hx of palpitations   Elevate legs and consider increased PA and compression stockings     Okay to continue doxepin as TCA and not ssri in regards to tamoxifen   Call with q/c  Follow up after testing. Spent over 25 minutes in room with patient  In terms of counseling on current medical conditions , work up and treatment. All questions answered. Seek immediate medical attention for any chest pain , severe trouble breathing and/or visual changes.

## 2020-12-10 ENCOUNTER — OFFICE VISIT (OUTPATIENT)
Dept: FAMILY MEDICINE CLINIC | Age: 45
End: 2020-12-10
Payer: COMMERCIAL

## 2020-12-10 ENCOUNTER — HOSPITAL ENCOUNTER (OUTPATIENT)
Age: 45
Setting detail: SPECIMEN
Discharge: HOME | End: 2020-12-10
Payer: COMMERCIAL

## 2020-12-10 VITALS
HEART RATE: 76 BPM | DIASTOLIC BLOOD PRESSURE: 78 MMHG | BODY MASS INDEX: 32.82 KG/M2 | WEIGHT: 197 LBS | SYSTOLIC BLOOD PRESSURE: 116 MMHG | RESPIRATION RATE: 18 BRPM | HEIGHT: 65 IN | OXYGEN SATURATION: 98 % | TEMPERATURE: 97.7 F

## 2020-12-10 LAB — SEDIMENTATION RATE, ERYTHROCYTE: 24 MM (ref 0–20)

## 2020-12-10 PROCEDURE — 99214 OFFICE O/P EST MOD 30 MIN: CPT | Performed by: INTERNAL MEDICINE

## 2020-12-10 RX ORDER — FLUCONAZOLE 100 MG/1
100 TABLET ORAL DAILY
Qty: 3 TABLET | Refills: 0 | Status: SHIPPED | OUTPATIENT
Start: 2020-12-10 | End: 2020-12-13

## 2020-12-10 RX ORDER — HYDROCODONE BITARTRATE AND ACETAMINOPHEN 5; 325 MG/1; MG/1
1 TABLET ORAL EVERY 6 HOURS PRN
Qty: 10 TABLET | Refills: 0 | Status: SHIPPED | OUTPATIENT
Start: 2020-12-10 | End: 2020-12-15

## 2020-12-10 RX ORDER — AMOXICILLIN AND CLAVULANATE POTASSIUM 875; 125 MG/1; MG/1
1 TABLET, FILM COATED ORAL 2 TIMES DAILY
Qty: 10 TABLET | Refills: 0 | Status: SHIPPED | OUTPATIENT
Start: 2020-12-10 | End: 2020-12-15

## 2020-12-10 RX ORDER — PREDNISONE 20 MG/1
TABLET ORAL
Qty: 18 TABLET | Refills: 0 | Status: SHIPPED | OUTPATIENT
Start: 2020-12-10 | End: 2020-12-20

## 2020-12-10 ASSESSMENT — ENCOUNTER SYMPTOMS
COLOR CHANGE: 0
ABDOMINAL PAIN: 1
SHORTNESS OF BREATH: 0
CHEST TIGHTNESS: 0
HEMATOCHEZIA: 1
BACK PAIN: 0
VOMITING: 0
ANAL BLEEDING: 0
ABDOMINAL DISTENTION: 1
CONSTIPATION: 0
WHEEZING: 0
COUGH: 0
CHOKING: 0
DIARRHEA: 1
BLOOD IN STOOL: 0
STRIDOR: 0
BELCHING: 0
RECTAL PAIN: 0
NAUSEA: 0
FLATUS: 0

## 2020-12-10 ASSESSMENT — CROHNS DISEASE ACTIVITY INDEX (CDAI): CDAI SCORE: 0

## 2020-12-10 NOTE — PROGRESS NOTES
significant for abdominal surgery and gallstones. There is no history of colon cancer, Crohn's disease, GERD, irritable bowel syndrome, pancreatitis, PUD or ulcerative colitis.        No results found for: LABA1C      ( goal A1C is < 7)   No results found for: LABMICR  No results found for: LDLCHOLESTEROL, LDLCALC    (goal LDL is <100)   AST (U/L)   Date Value   12/18/2019 14     ALT (U/L)   Date Value   12/18/2019 12     BUN (mg/dL)   Date Value   12/18/2019 8     BP Readings from Last 3 Encounters:   12/10/20 116/78   11/05/20 122/78   07/02/20 110/66          (goal 120/80)    Past Medical History:   Diagnosis Date    Breast cancer ([facility] [address]     right    Lumbar herniated disc     Polyradiculopathy 2015      Past Surgical History:   Procedure Laterality Date    APPENDECTOMY  2005    CHOLECYSTECTOMY  11/2019    MASTECTOMY, BILATERAL  07/2019    with expanders       Family History   Problem Relation Age of Onset    No Known Problems Mother     Diabetes Father     High Blood Pressure Father     No Known Problems Sister     No Known Problems Brother     Cancer Maternal Grandmother     Alcohol Abuse Maternal Grandfather     Diabetes Paternal Grandmother     Heart Disease Paternal Grandmother     High Cholesterol Paternal Grandmother     Stroke Paternal Grandmother     Diabetes Paternal Grandfather     Heart Disease Paternal Grandfather     High Cholesterol Paternal Grandfather        Social History     Tobacco Use    Smoking status: Never Smoker    Smokeless tobacco: Never Used   Substance Use Topics    Alcohol use: No      Current Outpatient Medications   Medication Sig Dispense Refill    amoxicillin-clavulanate (AUGMENTIN) 875-125 MG per tablet Take 1 tablet by mouth 2 times daily for 5 days 10 tablet 0    predniSONE (DELTASONE) 20 MG tablet 3 tabs x 3 days, then 2 tabs x 3 days, then 1 tab x 3 days 18 tablet 0    fluconazole (DIFLUCAN) 100 MG tablet Take 1 tablet by mouth daily for 3 days 3 shortness of breath, wheezing and stridor. Cardiovascular: Negative for chest pain, palpitations and leg swelling. Gastrointestinal: Positive for abdominal distention, abdominal pain, diarrhea and hematochezia. Negative for anal bleeding, anorexia, blood in stool, constipation, flatus, melena, nausea, rectal pain and vomiting. Genitourinary: Negative for frequency, hematuria, menstrual problem and pelvic pain. Musculoskeletal: Negative for arthralgias, back pain, gait problem and myalgias. Skin: Positive for rash. Negative for color change, pallor and wound. Allergic/Immunologic: Positive for immunocompromised state. Neurological: Negative for headaches. Psychiatric/Behavioral: Negative for sleep disturbance. The patient is nervous/anxious. Objective:      Physical Exam  Vitals signs and nursing note reviewed. Constitutional:       General: She is not in acute distress. Appearance: Normal appearance. She is well-developed. She is obese. She is not ill-appearing, toxic-appearing or diaphoretic. HENT:      Head: Normocephalic and atraumatic. Mouth/Throat:      Mouth: Mucous membranes are moist.   Neck:      Musculoskeletal: Normal range of motion and neck supple. No neck rigidity. Thyroid: No thyroid mass or thyroid tenderness. Vascular: No carotid bruit. Cardiovascular:      Rate and Rhythm: Normal rate and regular rhythm. No extrasystoles are present. Pulses: Normal pulses. Heart sounds: Normal heart sounds, S1 normal and S2 normal. Heart sounds not distant. No murmur. Pulmonary:      Effort: Pulmonary effort is normal. No bradypnea, accessory muscle usage, prolonged expiration, respiratory distress or retractions. Breath sounds: Normal breath sounds and air entry. No stridor, decreased air movement or transmitted upper airway sounds. No decreased breath sounds, wheezing, rhonchi or rales. Abdominal:      General: A surgical scar is present.  Bowel sounds are increased. There is distension. Palpations: Abdomen is soft. There is no hepatomegaly or splenomegaly. Tenderness: There is no abdominal tenderness. There is no right CVA tenderness, left CVA tenderness, guarding or rebound. Negative signs include Rovsing's sign. Hernia: No hernia is present. Musculoskeletal:      Right shoulder: She exhibits no tenderness, no bony tenderness, no pain, no spasm, normal pulse and normal strength. Left knee: She exhibits normal range of motion, no swelling, no effusion, no ecchymosis, no deformity, no laceration and no erythema. No tenderness found. Lumbar back: She exhibits spasm. She exhibits normal range of motion, no tenderness, no bony tenderness, no swelling, no edema, no deformity, no pain and normal pulse. Right lower leg: No edema. Left lower leg: No edema. Lymphadenopathy:      Cervical: No cervical adenopathy. Skin:     General: Skin is warm and dry. Capillary Refill: Capillary refill takes less than 2 seconds. Findings: No rash. Neurological:      General: No focal deficit present. Mental Status: She is alert and oriented to person, place, and time. Cranial Nerves: Cranial nerves are intact. No cranial nerve deficit. Sensory: No sensory deficit. Motor: Motor function is intact. No weakness, atrophy or abnormal muscle tone. Coordination: Coordination is intact. Coordination normal.      Gait: Gait abnormal.   Psychiatric:         Mood and Affect: Mood normal.         Behavior: Behavior normal.         Thought Content: Thought content normal.         Judgment: Judgment normal.       /78   Pulse 76   Temp 97.7 °F (36.5 °C) (Temporal)   Resp 18   Ht 5' 5\" (1.651 m)   Wt 197 lb (89.4 kg)   SpO2 98%   BMI 32.78 kg/m²     Assessment:       Diagnosis Orders   1.  Colitis  Gastrointestinal Panel, Molecular    Ova and parasite screen    Fecal lactoferrin    Stool For Wbc #1 MG tablet     Sig: Take 1 tablet by mouth daily for 3 days     Dispense:  3 tablet     Refill:  0    HYDROcodone-acetaminophen (NORCO) 5-325 MG per tablet     Sig: Take 1 tablet by mouth every 6 hours as needed for Pain for up to 5 days. Dispense:  10 tablet     Refill:  0     Reduce doses taken as pain becomes manageable    augmentin for 5 addl days  Appears likely infectious based on hx and labs but will check stool cx due to persistence of pain and other sxs/signs   Will check for celiac and ibd in blood work as well   Only 5 days further of pain medication . Diflucan for yeast on abx . Controlled substances monitoring: possible medication side effects, risk of tolerance and/or dependence, and alternative treatments discussed, no signs of potential drug abuse or diversion identified and OARRS report reviewed today- activity consistent with treatment plan. Reviewed labs , imaging and discharge instructions /med rec     Recheck ct abd/or mri for the cyst in 3-6 months   Call with q/c  F/u after test results  May gi referral for cscope. Patientgiven educational materials - see patient instructions. Discussed use, benefit,and side effects of prescribed medications. All patient questions answered. Ptvoiced understanding. Reviewed health maintenance. Instructed to continue currentmedications, diet and exercise. Patient agreed with treatment plan. Follow up asdirected.      Electronically signed by Darby Castillo DO on 12/10/2020 at 4:59 PM

## 2020-12-10 NOTE — PATIENT INSTRUCTIONS
Patient Education        Colitis: Care Instructions  Your Care Instructions  Colitis is the medical term for swelling (inflammation) of the intestine. It can be caused by different things, such as an infection or loss of blood flow in the intestine. Other causes are problems like Crohn's disease or ulcerative colitis. Symptoms may include fever, diarrhea that may be bloody, or belly pain. Sometimes symptoms go away without treatment. But you may need treatment or more tests, such as blood tests or a stool test. Or you may need imaging tests like a CT scan or a colonoscopy. In some cases, the doctor may want to test a sample of tissue from the intestine. This test is called a biopsy. The doctor has checked you carefully, but problems can develop later. If you notice any problems or new symptoms, get medical treatment right away. Follow-up care is a key part of your treatment and safety. Be sure to make and go to all appointments, and call your doctor if you are having problems. It's also a good idea to know your test results and keep a list of the medicines you take. How can you care for yourself at home? · Rest until you feel better. · Your doctor may recommend that you eat bland foods. These include rice, dry toast or crackers, bananas, and applesauce. · To prevent dehydration, drink plenty of fluids. Choose water and other caffeine-free clear liquids until you feel better. If you have kidney, heart, or liver disease and have to limit fluids, talk with your doctor before you increase the amount of fluids you drink. · Be safe with medicines. Take your medicines exactly as prescribed. Call your doctor if you think you are having a problem with your medicine. You will get more details on the specific medicines your doctor prescribes. When should you call for help? Call 911 anytime you think you may need emergency care.  For example, call if:    · You passed out (lost consciousness).     · Your stools are maroon or very bloody. Call your doctor now or seek immediate medical care if:    · You have new or worse belly pain.     · You have a fever.     · You are vomiting.     · You cannot pass stools or gas.     · You have new or more blood in your stools. Watch closely for changes in your health, and be sure to contact your doctor if:    · You have new or worse symptoms.     · You are losing weight.     · You do not get better as expected. Where can you learn more? Go to [email or web address].[email or web address] org and sign in to your [email or web address] account. Enter X422 in the BrainRush box to learn more about \"Colitis: Care Instructions. \"     If you do not have an account, please click on the \"Sign Up Now\" link. Current as of: April 15, 2020               Content Version: 12.6  © 6237-1332 [email or web address]. Care instructions adapted under license by [facility] ([facility]). If you have questions about a medical condition or this instruction, always ask your healthcare professional. [address] any warranty or liability for your use of this information. Patient Education        Learning About Colitis  What is colitis? Colitis is swelling (inflammation) of the colon. The colon makes up most of the large intestine. Many conditions can cause colitis. What are some types of colitis? · Infectious colitis is a type that appears suddenly. It's caused by a bacteria or virus, such as salmonella, shigella, or campylobacter. · Ischemic colitis is caused by problems with blood flow to the colon. This can happen after surgery. It can also be caused by other health problems. · Microscopic colitis doesn't always have a clear cause. It can only be found with special tests. · Crohn's disease and ulcerative colitis are lifelong diseases. They can cause swelling, inflammation, and deep sores in the lining of the digestive tract. What are the symptoms?   Symptoms may include fever, diarrhea that may be bloody, or belly pain. You may also have an urgent need to move your bowels or pain when you move your bowels. Or you may have bleeding from the rectum or weight loss. Your symptoms may depend on the type of colitis you have. For example, microscopic colitis may cause watery diarrhea. Sometimes symptoms go away on their own. If they don't go away, or if you have bleeding or severe pain, call your doctor right away. How is it diagnosed? You may need blood tests or a stool test. You also may need imaging tests like a CT scan. You may have a colonoscopy so that a doctor can look inside your colon. In some cases, the doctor may want to test a sample of tissue from the intestine. This test is called a biopsy. How is it treated? Treatment for colitis depends on the condition that is causing it. · Antibiotics may be used to treat an infection. · Diet changes may help with symptoms. · Medicines can also help to relieve inflammation and control symptoms. · In some cases, surgery to remove parts of the intestine may be needed. Follow-up care is a key part of your treatment and safety. Be sure to make and go to all appointments, and call your doctor if you are having problems. It's also a good idea to know your test results and keep a list of the medicines you take. Where can you learn more? Go to https://Glance [email or web address] org and sign in to your OneTok account. Enter C130 in the [facility] box to learn more about \"Learning About Colitis. \"     If you do not have an account, please click on the \"Sign Up Now\" link. Current as of: April 15, 2020               Content Version: 12.6  © 3794-1891 [employer]. Care instructions adapted under license by [facility] ([facility]).  If you have questions about a medical condition or this instruction, always ask your healthcare professional. [address] any warranty or liability for your use of this

## 2020-12-11 LAB
GLIADIN DEAMINIDATED PEPTIDE AB IGA: 1.1 U/ML
GLIADIN DEAMINIDATED PEPTIDE AB IGG: 1 U/ML
IGA: 152 MG/DL (ref 70–400)
TISSUE TRANSGLUTAMINASE IGA: 0.7 U/ML

## 2020-12-14 LAB
SACCHAROMYCES CEREVISIAE ANTIBODY IGA: 10.8 UNITS (ref 0–24.9)
SACCHAROMYCES CEREVISIAE ANTIBODY IGG: 41.6 UNITS (ref 0–24.9)

## 2020-12-24 RX ORDER — TIZANIDINE 4 MG/1
4 TABLET ORAL EVERY 8 HOURS PRN
Qty: 60 TABLET | Refills: 3 | Status: SHIPPED | OUTPATIENT
Start: 2020-12-24 | End: 2021-05-10 | Stop reason: SDUPTHER

## 2021-01-22 DIAGNOSIS — G62.9 NEUROPATHY: ICD-10-CM

## 2021-01-22 RX ORDER — GABAPENTIN 300 MG/1
300 CAPSULE ORAL 2 TIMES DAILY PRN
Qty: 60 CAPSULE | Refills: 1 | Status: SHIPPED | OUTPATIENT
Start: 2021-01-22 | End: 2021-08-09 | Stop reason: SDUPTHER

## 2021-03-02 ENCOUNTER — TELEPHONE (OUTPATIENT)
Dept: FAMILY MEDICINE CLINIC | Age: 46
End: 2021-03-02

## 2021-03-02 DIAGNOSIS — C50.411 MALIGNANT NEOPLASM OF UPPER-OUTER QUADRANT OF RIGHT BREAST IN FEMALE, ESTROGEN RECEPTOR POSITIVE (HCC): ICD-10-CM

## 2021-03-02 DIAGNOSIS — F41.0 PANIC ATTACKS: ICD-10-CM

## 2021-03-02 DIAGNOSIS — F41.9 ANXIETY: ICD-10-CM

## 2021-03-02 DIAGNOSIS — Z17.0 MALIGNANT NEOPLASM OF UPPER-OUTER QUADRANT OF RIGHT BREAST IN FEMALE, ESTROGEN RECEPTOR POSITIVE (HCC): ICD-10-CM

## 2021-03-02 RX ORDER — LORAZEPAM 0.5 MG/1
0.5 TABLET ORAL EVERY 8 HOURS PRN
Qty: 30 TABLET | Refills: 0 | Status: SHIPPED | OUTPATIENT
Start: 2021-03-02 | End: 2021-04-01

## 2021-04-29 ENCOUNTER — OFFICE VISIT (OUTPATIENT)
Dept: FAMILY MEDICINE CLINIC | Age: 46
End: 2021-04-29
Payer: COMMERCIAL

## 2021-04-29 VITALS
DIASTOLIC BLOOD PRESSURE: 76 MMHG | BODY MASS INDEX: 32.15 KG/M2 | SYSTOLIC BLOOD PRESSURE: 132 MMHG | HEIGHT: 65 IN | HEART RATE: 123 BPM | WEIGHT: 193 LBS | OXYGEN SATURATION: 98 %

## 2021-04-29 DIAGNOSIS — M25.561 ACUTE PAIN OF RIGHT KNEE: ICD-10-CM

## 2021-04-29 DIAGNOSIS — M25.559 ACUTE HIP PAIN, UNSPECIFIED LATERALITY: Primary | ICD-10-CM

## 2021-04-29 DIAGNOSIS — G89.18 POST-OPERATIVE PAIN: ICD-10-CM

## 2021-04-29 PROCEDURE — 99213 OFFICE O/P EST LOW 20 MIN: CPT | Performed by: INTERNAL MEDICINE

## 2021-04-29 RX ORDER — DOXEPIN HYDROCHLORIDE 100 MG/1
100 CAPSULE ORAL NIGHTLY
Qty: 30 CAPSULE | Refills: 3 | Status: SHIPPED | OUTPATIENT
Start: 2021-04-29 | End: 2021-04-29

## 2021-04-29 RX ORDER — CYCLOBENZAPRINE HCL 5 MG
5 TABLET ORAL 3 TIMES DAILY PRN
Qty: 30 TABLET | Refills: 0 | Status: SHIPPED | OUTPATIENT
Start: 2021-04-29 | End: 2021-05-09

## 2021-04-29 RX ORDER — HYDROCODONE BITARTRATE AND ACETAMINOPHEN 5; 325 MG/1; MG/1
1 TABLET ORAL EVERY 4 HOURS PRN
COMMUNITY
Start: 2021-04-16 | End: 2021-04-29

## 2021-04-29 RX ORDER — DOXEPIN HYDROCHLORIDE 150 MG/1
150 CAPSULE ORAL NIGHTLY
Qty: 30 CAPSULE | Refills: 5 | Status: SHIPPED | OUTPATIENT
Start: 2021-04-29 | End: 2021-08-09

## 2021-04-29 RX ORDER — PREDNISONE 20 MG/1
20 TABLET ORAL 2 TIMES DAILY
Qty: 10 TABLET | Refills: 0 | Status: SHIPPED | OUTPATIENT
Start: 2021-04-29 | End: 2021-05-04

## 2021-04-29 ASSESSMENT — ENCOUNTER SYMPTOMS
CONSTIPATION: 0
BACK PAIN: 0
ABDOMINAL PAIN: 0
CHEST TIGHTNESS: 0
STRIDOR: 0
WHEEZING: 0
COLOR CHANGE: 0
SHORTNESS OF BREATH: 0
COUGH: 0
DIARRHEA: 0
CHOKING: 0

## 2021-04-29 ASSESSMENT — PATIENT HEALTH QUESTIONNAIRE - PHQ9
2. FEELING DOWN, DEPRESSED OR HOPELESS: 0
SUM OF ALL RESPONSES TO PHQ QUESTIONS 1-9: 0
SUM OF ALL RESPONSES TO PHQ QUESTIONS 1-9: 0
1. LITTLE INTEREST OR PLEASURE IN DOING THINGS: 0

## 2021-04-29 NOTE — PROGRESS NOTES
Visit Information    Have you changed or started any medications since your last visit including any over-the-counter medicines, vitamins, or herbal medicines? no   Are you having any side effects from any of your medications? -  no  Have you stopped taking any of your medications? Is so, why? -  no    Have you seen any other physician or provider since your last visit? No  Have you had any other diagnostic tests since your last visit? No  Have you been seen in the emergency room and/or had an admission to a hospital since we last saw you? No  Have you had your routine dental cleaning in the past 6 months? no    Have you activated your [employer] account? If not, what are your barriers?  No:      Patient Care Team:  Viji Camacho DO as PCP - General (Family Medicine)  Viji Camacho DO as PCP - [facility] Provider    Medical History Review  Past Medical, Family, and Social History reviewed and does contribute to the patient presenting condition    Health Maintenance   Topic Date Due    Hepatitis C screen  Never done    HIV screen  Never done    COVID-19 Vaccine (1) Never done    DTaP/Tdap/Td vaccine (1 - Tdap) Never done    Cervical cancer screen  Never done    Lipid screen  Never done    Flu vaccine (Season Ended) 09/01/2021    Hepatitis A vaccine  Aged Out    Hepatitis B vaccine  Aged Out    Hib vaccine  Aged Out    Meningococcal (ACWY) vaccine  Aged Out    Pneumococcal 0-64 years Vaccine  Aged Out

## 2021-04-29 NOTE — PATIENT INSTRUCTIONS
ankle with your other hand. 3. Stretch your leg back by pulling your foot toward your buttock. You will feel the stretch in the front of your thigh. If this causes stress on your knee, do not do this stretch. 4. Hold the stretch for at least 15 to 30 seconds. 5. Repeat 2 to 4 times. Hip flexor stretch (kneeling)   1. Kneel on your affected leg and bend your good leg out in front of you, with that foot flat on the floor. If you feel discomfort in the front of your knee, place a towel under your knee. 2. Keeping your back straight, slowly push your hips forward until you feel a stretch in the upper thigh of your back leg and hip. 3. Hold the stretch for at least 15 to 30 seconds. 4. Repeat 2 to 4 times. Hip flexor stretch (edge of table)   1. Lie flat on your back on a table or flat bench, with your knees and lower legs hanging off the edge of the table. 2. Grab your good leg at the knee, and pull that knee back toward your chest. Relax your affected leg and let it hang down toward the floor until you feel a stretch in the upper thigh of your affected leg and hip. 3. Hold the stretch for at least 15 to 30 seconds. 4. Repeat 2 to 4 times. Follow-up care is a key part of your treatment and safety. Be sure to make and go to all appointments, and call your doctor if you are having problems. It's also a good idea to know your test results and keep a list of the medicines you take. Where can you learn more? Go to https://[email or web address].in[employer]te [email or web address] org and sign in to your [employer] account. Enter W703 in the [employer]cast box to learn more about \"Hip Flexor Strain: Rehab Exercises. \"     If you do not have an account, please click on the \"Sign Up Now\" link. Current as of: November 16, 2020               Content Version: 12.8  © 7717-9797 Healthwise, Incorporated. Care instructions adapted under license by [facility] ([facility]).  If you have questions about a medical condition or this instruction, always ask your healthcare professional. [place] [address] any warranty or liability for your use of this information. Patient Education        Gluteal Strain: Rehab Exercises  Introduction  Here are some examples of exercises for you to try. The exercises may be suggested for a condition or for rehabilitation. Start each exercise slowly. Ease off the exercises if you start to have pain. You will be told when to start these exercises and which ones will work best for you. How to do the exercises  Hip rotator stretch   1. Lie on your back with both knees bent and your feet flat on the floor. 2. Put the ankle of your affected leg on your opposite thigh near your knee. 3. Use your hand to gently push the knee of your affected leg away from your body until you feel a gentle stretch around your hip. 4. Hold the stretch for 15 to 30 seconds. 5. Repeat 2 to 4 times. 6. Repeat steps 1 through 5, but this time use your hand to gently pull your knee toward your opposite shoulder. 7. Switch legs and repeat steps 1 through 6, even if only one hip is sore. Seated hip rotator stretch   1. Sit in a sturdy chair. 2. Cross your affected leg over your knee, resting your foot on top of your knee. 3. Keep your back straight, and slowly lean forward until you feel a stretch in your hip. 4. Hold for 15 to 30 seconds. 5. Switch legs and repeat steps 1 through 4 on your other side. 6. Repeat 2 to 4 times. Hamstring stretch (lying down)   1. Lie flat on your back with your legs straight. If you feel discomfort in your back, place a small towel roll under your lower back. 2. Holding the back of your affected leg, lift your leg straight up and toward your body until you feel a stretch at the back of your thigh. 3. Hold the stretch for at least 30 seconds. 4. Repeat 2 to 4 times. 5. Switch legs and repeat steps 1 through 4, even if only one hip is sore. Bridging   1.  Lie on your back with both knees bent. Your knees should be bent about 90 degrees. 2. Then push your feet into the floor, squeeze your buttocks, and lift your hips off the floor until your shoulders, hips, and knees are all in a straight line. 3. Hold for about 6 seconds as you continue to breathe normally, and then slowly lower your hips back down to the floor and rest for up to 10 seconds. 4. Repeat 8 to 12 times. Single-leg bridge   1. Lie on your back, with your arms at your sides. 2. Bend one knee, and keep that foot flat on the floor. The other leg should be straight. 3. Raise the straight leg up so that the knee is level with the bent knee. 4. Tighten your belly muscles by pulling your belly button in toward your spine. Lift your buttocks up and be careful not to let your hips drop down. 5. Hold for about 6 seconds as you continue to breathe normally, and then slowly lower your hips back down to the floor. 6. Switch legs and repeat steps 1 though 5.  7. Repeat 8 to 12 times. Follow-up care is a key part of your treatment and safety. Be sure to make and go to all appointments, and call your doctor if you are having problems. It's also a good idea to know your test results and keep a list of the medicines you take. Where can you learn more? Go to https://[email or web address] org and sign in to your [employer] account. Enter Q150 in the Simperium box to learn more about \"Gluteal Strain: Rehab Exercises. \"     If you do not have an account, please click on the \"Sign Up Now\" link. Current as of: November 16, 2020               Content Version: 12.8  © 0389-7422 Healthwise, Incorporated. Care instructions adapted under license by [facility] ([facility]). If you have questions about a medical condition or this instruction, always ask your healthcare professional. [address] any warranty or liability for your use of this information.

## 2021-04-29 NOTE — PROGRESS NOTES
[address] [address] [facility]  [address] [address]  Dept: [phone]  Dept Fax: [phone]    Tamar Willett a 39 y.o. female who presents today for her medical conditions/complaints as notedbelow. Teodora Cole is c/o of   Chief Complaint   Patient presents with    Hip Pain     had breast surgery, 4 days post op she started getting bad hip pain. when she made appt it was bad, pain kind of went away but still there       HPI:     Hip Pain   The incident occurred more than 1 week ago. The injury mechanism was a twisting injury. The pain is present in the right hip, right thigh and right knee. The quality of the pain is described as aching. The pain is at a severity of 5/10. The pain is moderate. The pain has been improving since onset. Pertinent negatives include no inability to bear weight, loss of motion, loss of sensation, muscle weakness, numbness or tingling. She reports no foreign bodies present. The symptoms are aggravated by movement, palpation and weight bearing. She has tried non-weight bearing, heat, elevation, ice, NSAIDs, acetaminophen and rest for the symptoms. The treatment provided mild relief.        No results found for: LABA1C      ( goal A1C is < 7)   No results found for: LABMICR  No results found for: LDLCHOLESTEROL, LDLCALC    (goal LDL is <100)   AST (U/L)   Date Value   12/18/2019 14     ALT (U/L)   Date Value   12/18/2019 12     BUN (mg/dL)   Date Value   12/18/2019 8     BP Readings from Last 3 Encounters:   04/29/21 132/76   12/10/20 116/78   11/05/20 122/78          (goal 120/80)    Past Medical History:   Diagnosis Date    Breast cancer ([facility] [address]     right    Lumbar herniated disc     Polyradiculopathy 2015      Past Surgical History:   Procedure Laterality Date    APPENDECTOMY  2005    CHOLECYSTECTOMY  11/2019    MASTECTOMY, BILATERAL  07/2019    with expanders       Family History   Problem Relation Age of Onset    No Known Problems Mother     Diabetes Father     High Blood Pressure Father     No Known Problems Sister     No Known Problems Brother     Cancer Maternal Grandmother     Alcohol Abuse Maternal Grandfather     Diabetes Paternal Grandmother     Heart Disease Paternal Grandmother     High Cholesterol Paternal Grandmother     Stroke Paternal Grandmother     Diabetes Paternal Grandfather     Heart Disease Paternal Grandfather     High Cholesterol Paternal Grandfather        Social History     Tobacco Use    Smoking status: Never Smoker    Smokeless tobacco: Never Used   Substance Use Topics    Alcohol use: No      Current Outpatient Medications   Medication Sig Dispense Refill    predniSONE (DELTASONE) 20 MG tablet Take 1 tablet by mouth 2 times daily for 5 days 10 tablet 0    cyclobenzaprine (FLEXERIL) 5 MG tablet Take 1 tablet by mouth 3 times daily as needed for Muscle spasms 30 tablet 0    doxepin (SINEQUAN) 150 MG capsule Take 1 capsule by mouth nightly 30 capsule 5    gabapentin (NEURONTIN) 300 MG capsule Take 1 capsule by mouth 2 times daily as needed (nerve pain) for up to 180 days. Intended supply: 90 days 60 capsule 1    tiZANidine (ZANAFLEX) 4 MG tablet Take 1 tablet by mouth every 8 hours as needed (muscle spasm) 60 tablet 3    tamoxifen (NOLVADEX) 20 MG tablet Take 1 tablet by mouth daily 90 tablet 3    acetaminophen (TYLENOL) 325 MG tablet Take 650 mg by mouth every 6 hours as needed for Pain      diphenhydrAMINE (BENADRYL) 50 MG capsule Take 50 mg by mouth every 6 hours as needed for Itching      melatonin 3 MG TABS tablet Take 3 mg by mouth daily      diphenhydrAMINE-APAP, sleep, (TYLENOL PM EXTRA STRENGTH)  MG tablet Take 1 tablet by mouth nightly as needed for Sleep       No current facility-administered medications for this visit.       Allergies   Allergen Reactions    Benzoyl Peroxide Swelling    Sulfamethoxazole-Trimethoprim Rash    Benzyl Alcohol Swelling     Eye lids swell    Hydromorphone      Bugs crawling on skin          Health Maintenance   Topic Date Due    Hepatitis C screen  Never done    HIV screen  Never done    COVID-19 Vaccine (1) Never done    Cervical cancer screen  Never done    Lipid screen  Never done    DTaP/Tdap/Td vaccine (1 - Tdap) 04/29/2022 (Originally 9/30/1996)    Flu vaccine (Season Ended) 09/01/2021    Hepatitis A vaccine  Aged Out    Hepatitis B vaccine  Aged Out    Hib vaccine  Aged Out    Meningococcal (ACWY) vaccine  Aged Out    Pneumococcal 0-64 years Vaccine  Aged Out       Subjective:     Review of Systems   Constitutional: Positive for activity change. Negative for appetite change, chills, diaphoresis, fatigue, fever and unexpected weight change. Eyes: Negative for visual disturbance. Respiratory: Negative for cough, choking, chest tightness, shortness of breath, wheezing and stridor. Cardiovascular: Negative for chest pain, palpitations and leg swelling. Gastrointestinal: Negative for abdominal pain, constipation and diarrhea. Musculoskeletal: Positive for arthralgias and gait problem. Negative for back pain, joint swelling, myalgias, neck pain and neck stiffness. Skin: Negative for color change, pallor, rash and wound. Allergic/Immunologic: Negative for immunocompromised state. Neurological: Negative for tingling, numbness and headaches. Psychiatric/Behavioral: Negative for sleep disturbance. Objective:      Physical Exam  Vitals signs and nursing note reviewed. Constitutional:       General: She is not in acute distress. Appearance: She is obese. She is not ill-appearing, toxic-appearing or diaphoretic. HENT:      Head: Normocephalic and atraumatic. Musculoskeletal:      Right hip: She exhibits decreased range of motion, tenderness and bony tenderness. She exhibits normal strength, no swelling, no crepitus, no deformity and no laceration.       Right knee: She exhibits normal range of motion, no swelling, no effusion, no deformity, no laceration, normal patellar mobility and no MCL laxity. No tenderness found. Skin:     General: Skin is warm and dry. Coloration: Skin is not jaundiced or pale. Findings: No bruising, erythema, lesion or rash. Neurological:      General: No focal deficit present. Mental Status: She is alert and oriented to person, place, and time. Cranial Nerves: No cranial nerve deficit. Sensory: No sensory deficit. Motor: No weakness. Gait: Gait abnormal.   Psychiatric:         Mood and Affect: Mood normal.         Thought Content: Thought content normal.         Judgment: Judgment normal.       /76   Pulse 123   Ht 5' 5\" (1.651 m)   Wt 193 lb (87.5 kg)   SpO2 98%   BMI 32.12 kg/m²     Assessment:       Diagnosis Orders   1. Acute hip pain, unspecified laterality  XR HIP 2-3 VW W PELVIS RIGHT   2. Post-operative pain  XR HIP 2-3 VW W PELVIS RIGHT   3. Acute pain of right knee  XR HIP 2-3 VW W PELVIS RIGHT    XR KNEE RIGHT (3 VIEWS)             Plan:       Return if symptoms worsen or fail to improve.     Orders Placed This Encounter   Procedures    XR HIP 2-3 VW W PELVIS RIGHT     Standing Status:   Future     Standing Expiration Date:   4/29/2022    XR KNEE RIGHT (3 VIEWS)     Standing Status:   Future     Standing Expiration Date:   4/29/2022     Orders Placed This Encounter   Medications    predniSONE (DELTASONE) 20 MG tablet     Sig: Take 1 tablet by mouth 2 times daily for 5 days     Dispense:  10 tablet     Refill:  0    DISCONTD: doxepin (SINEQUAN) 100 MG capsule     Sig: Take 1 capsule by mouth nightly     Dispense:  30 capsule     Refill:  3    cyclobenzaprine (FLEXERIL) 5 MG tablet     Sig: Take 1 tablet by mouth 3 times daily as needed for Muscle spasms     Dispense:  30 tablet     Refill:  0    doxepin (SINEQUAN) 150 MG capsule     Sig: Take 1 capsule by mouth nightly     Dispense:  30 capsule     Refill:  5 Cancel 100 mg dose    u/s done at [place] neg for dvt   Exercises given   Trial flex at bedtime vs the zanafle , do not take together   Prednisone burst    xrays given to do if sxs persist after this tx ie in another 2 weeks   Call with q/c      Patientgiven educational materials - see patient instructions. Discussed use, benefit,and side effects of prescribed medications. All patient questions answered. Ptvoiced understanding. Reviewed health maintenance. Instructed to continue currentmedications, diet and exercise. Patient agreed with treatment plan. Follow up asdirected.      Electronically signed by James Leyva DO on 4/29/2021 at 1:27 PM

## 2021-05-10 DIAGNOSIS — M62.81 MUSCLE WEAKNESS: ICD-10-CM

## 2021-05-11 RX ORDER — TIZANIDINE 4 MG/1
4 TABLET ORAL EVERY 8 HOURS PRN
Qty: 60 TABLET | Refills: 3 | Status: SHIPPED | OUTPATIENT
Start: 2021-05-11 | End: 2021-08-24 | Stop reason: SDUPTHER

## 2021-05-14 ENCOUNTER — OFFICE VISIT (OUTPATIENT)
Dept: FAMILY MEDICINE CLINIC | Age: 46
End: 2021-05-14
Payer: COMMERCIAL

## 2021-05-14 VITALS
HEART RATE: 98 BPM | HEIGHT: 65 IN | BODY MASS INDEX: 32.15 KG/M2 | OXYGEN SATURATION: 97 % | DIASTOLIC BLOOD PRESSURE: 78 MMHG | WEIGHT: 193 LBS | SYSTOLIC BLOOD PRESSURE: 118 MMHG | RESPIRATION RATE: 18 BRPM

## 2021-05-14 DIAGNOSIS — M79.89 PAIN AND SWELLING OF RIGHT UPPER EXTREMITY: ICD-10-CM

## 2021-05-14 DIAGNOSIS — Z76.89 ENCOUNTER TO ESTABLISH CARE: Primary | ICD-10-CM

## 2021-05-14 DIAGNOSIS — M79.601 PAIN AND SWELLING OF RIGHT UPPER EXTREMITY: ICD-10-CM

## 2021-05-14 DIAGNOSIS — Z13.220 SCREENING FOR HYPERLIPIDEMIA: ICD-10-CM

## 2021-05-14 PROCEDURE — 99213 OFFICE O/P EST LOW 20 MIN: CPT | Performed by: NURSE PRACTITIONER

## 2021-05-14 ASSESSMENT — ENCOUNTER SYMPTOMS
ABDOMINAL PAIN: 0
SORE THROAT: 0
NAUSEA: 0
COUGH: 0
DIARRHEA: 0
SINUS PAIN: 0
VOMITING: 0
SHORTNESS OF BREATH: 0

## 2021-05-14 NOTE — PROGRESS NOTES
Visit Information    Have you changed or started any medications since your last visit including any over-the-counter medicines, vitamins, or herbal medicines? no   Are you having any side effects from any of your medications? -  no  Have you stopped taking any of your medications? Is so, why? -  no    Have you seen any other physician or provider since your last visit? No  Have you had any other diagnostic tests since your last visit? No  Have you been seen in the emergency room and/or had an admission to a hospital since we last saw you? No  Have you had your routine dental cleaning in the past 6 months? no    Have you activated your [employer] account? If not, what are your barriers?  Yes     Patient Care Team:  ALVAREZ Silva - CNP as PCP - General (Certified Nurse Practitioner)  Edgar Doty DO as PCP - [facility] Provider    Medical History Review  Past Medical, Family, and Social History reviewed and does contribute to the patient presenting condition    Health Maintenance   Topic Date Due    Hepatitis C screen  Never done    COVID-19 Vaccine (1) Never done    HIV screen  Never done    Cervical cancer screen  Never done    Lipid screen  Never done    DTaP/Tdap/Td vaccine (1 - Tdap) 04/29/2022 (Originally 9/30/1996)    Flu vaccine (Season Ended) 09/01/2021    Hepatitis A vaccine  Aged Out    Hepatitis B vaccine  Aged Out    Hib vaccine  Aged Out    Meningococcal (ACWY) vaccine  Aged Out    Pneumococcal 0-64 years Vaccine  Aged Out

## 2021-05-14 NOTE — PATIENT INSTRUCTIONS
protect your skin from sunburn and insect bites.     · Use an electric razor to shave your armpit. It's less likely to cut or irritate your skin. Activity    · Don't wear clothing or jewelry that is tight on your arm or hand. Your doctor may advise you not to wear a watch or rings on the affected hand.     · Wear gloves when you do activities that could hurt the skin on your fingers or hand. Wear them when you garden, do yard work, wash dishes, and clean with chemicals. Use oven mitts when you handle hot food.     · Do not have blood drawn from the arm on the side of the lymph node surgery. Do not get injections (shots) or have an IV put in the affected arm.     · Do not allow a blood pressure cuff to be placed on that arm. If you are in the hospital, make sure you tell your nurse and other hospital staff about your condition.     · Do not expose your arm to very hot or very cold temperatures. For example, do not use hot tubs, saunas, or steam rooms. Do not use a heating pad or cold pack on that arm or shoulder.     · Rest your arm often when you do repeated movements, such as vacuum, scrub, or mop.     · Try to use your other arm to carry heavy things, such as grocery bags. Avoid shoulder straps when you carry a briefcase or purse. Carry your purse on your good arm.     · Ask your doctor about wearing a compression sleeve and glove (gauntlet). Your doctor may want you to wear these when you exercise or when you fly in an airplane. They can help keep fluid from pooling in your arm and hand. Exercise    · Ask your doctor about exercises for your arm and hand. Your doctor may recommend that you see a physical therapist. This person can teach you how to do self-massage to move fluid out of your arm.     · Check with your doctor before you start exercises that use the arm. This includes tennis, rowing, or weight lifting. Your doctor can help you find an activity level that is right for you.    When should you call for help? Call your doctor now or seek immediate medical care if:    · You have signs of infection, such as:  ? Increased pain, swelling, warmth, or redness. ? Red streaks leading from the area. ? Pus draining from the area. ? A fever. Watch closely for changes in your health, and be sure to contact your doctor if:    · You have new or worse symptoms from lymphedema.     · You do not get better as expected. Where can you learn more? Go to [email or web address] [email or web address] org and sign in to your [employer] account. Enter G546 in the [employer] box to learn more about \"Preventing Lymphedema After Treatment for Breast Cancer: Care Instructions. \"     If you do not have an account, please click on the \"Sign Up Now\" link. Current as of: December 17, 2020               Content Version: 12.8  © 3446-4705 Healthwise, Incorporated. Care instructions adapted under license by [facility] ([facility]). If you have questions about a medical condition or this instruction, always ask your healthcare professional. [address] any warranty or liability for your use of this information.

## 2021-05-14 NOTE — PROGRESS NOTES
[address]  [address] [facility] [address] [address]  Dept: [phone]  Dept Fax: [phone]    Cooper Guevara is a 39 y.o. female who presents today for her medicalconditions/complaints as noted below. Cooper Guevara is c/o of Established New Doctor and Arm Pain (right arm. arm that had lymphnodes removed- pt thinks its lympedema)      HPI:         73-year-old female patient presents with complaint of encounter to establish care. Patient has significant history of breast cancer, had a lymph node removed and has had several breast surgeries and reconstructions. Reports that several months ago she had a breast reconstruction. Patient is concerned that over the past week she has noticed pain and swelling to the right upper arm. Currently takes oral tamoxifen. Follows with general surgery and heme oncology out of [facility]    Additionally patient has significant history of spinal stenosis, neuropathy takes oral gabapentin    Significant history of insomnia takes doxepin nightly. Past Medical History:   Diagnosis Date    Breast cancer ([address]     right    Lumbar herniated disc     Polyradiculopathy 2015        Current Outpatient Medications   Medication Sig Dispense Refill    tiZANidine (ZANAFLEX) 4 MG tablet Take 1 tablet by mouth every 8 hours as needed (muscle spasm) 60 tablet 3    doxepin (SINEQUAN) 150 MG capsule Take 1 capsule by mouth nightly 30 capsule 5    gabapentin (NEURONTIN) 300 MG capsule Take 1 capsule by mouth 2 times daily as needed (nerve pain) for up to 180 days.  Intended supply: 90 days 60 capsule 1    tamoxifen (NOLVADEX) 20 MG tablet Take 1 tablet by mouth daily 90 tablet 3    acetaminophen (TYLENOL) 325 MG tablet Take 650 mg by mouth every 6 hours as needed for Pain      diphenhydrAMINE (BENADRYL) 50 MG capsule Take 50 mg by mouth every 6 hours as needed for Itching      melatonin 3 MG TABS tablet Take 3 mg by mouth daily      diphenhydrAMINE-APAP, sleep, (TYLENOL PM EXTRA STRENGTH)  MG tablet Take 1 tablet by mouth nightly as needed for Sleep       No current facility-administered medications for this visit. Allergies   Allergen Reactions    Benzoyl Peroxide Swelling    Sulfamethoxazole-Trimethoprim Rash    Benzyl Alcohol Swelling     Eye lids swell    Hydromorphone      Bugs crawling on skin       Subjective:      Review of Systems   Constitutional: Negative for chills and fever. HENT: Negative for ear pain, sinus pain and sore throat. Respiratory: Negative for cough and shortness of breath. Cardiovascular: Negative for chest pain and palpitations. Gastrointestinal: Negative for abdominal pain, diarrhea, nausea and vomiting. Musculoskeletal: Positive for arthralgias and joint swelling. Neurological: Negative for dizziness and headaches. All other systems reviewed and are negative.      :Objective     Physical Exam  Vitals signs and nursing note reviewed. Constitutional:       Appearance: Normal appearance. HENT:      Mouth/Throat:      Mouth: Mucous membranes are moist.   Cardiovascular:      Rate and Rhythm: Normal rate. Pulmonary:      Effort: Pulmonary effort is normal. No respiratory distress. Breath sounds: Normal breath sounds. Musculoskeletal:        Arms:    Skin:     General: Skin is warm and dry. Neurological:      General: No focal deficit present. Mental Status: She is alert and oriented to person, place, and time.        /78   Pulse 98   Resp 18   Ht 5' 5\" (1.651 m)   Wt 193 lb (87.5 kg)   LMP 05/07/2021   SpO2 97%   BMI 32.12 kg/m²     Lab Review   Hospital Outpatient Visit on 12/10/2020   Component Date Value    IgG,S cerevisiae Ab 12/10/2020 41.6*    IgA,S cerevisiae Ab 12/10/2020 10.8     Gliadin Deaminidated Pep* 12/10/2020 1.1     Gliadin Deaminidated Pep* 12/10/2020 1.0     IgA 12/10/2020 152     TISSUE TRANSGLUTAMINASE * 12/10/2020 0.7  Sed Rate 12/10/2020 24*       Assessment and Plan      1. Encounter to establish care  2. Screening for hyperlipidemia  -     Lipid, Fasting; Future  3. Pain and swelling of right upper extremity  -     US DUP UPPER EXTREMITY RIGHT VENOUS; Future         Return in about 3 months (around 8/14/2021), or if symptoms worsen or fail to improve. No orders of the defined types were placed in this encounter. Patient given educational materials - see patient instructions. Discussed use, benefit, and side effects of prescribed medications. All patientquestions answered. Pt voiced understanding. Patient given educational materials - see patient instructions. Discussed use, benefit, and side effects of prescribed medications. All patientquestions answered. Pt voiced understanding. This note was transcribed using dictation with Dragon services. Efforts were made to correct any errors but some words may be misinterpreted.     Electronically signed by Marko Gitelman, APRN - CNP on 5/14/2021at 10:15 AM

## 2021-08-09 ENCOUNTER — OFFICE VISIT (OUTPATIENT)
Dept: FAMILY MEDICINE CLINIC | Age: 46
End: 2021-08-09
Payer: COMMERCIAL

## 2021-08-09 VITALS
HEIGHT: 65 IN | HEART RATE: 103 BPM | BODY MASS INDEX: 32.82 KG/M2 | RESPIRATION RATE: 18 BRPM | SYSTOLIC BLOOD PRESSURE: 118 MMHG | WEIGHT: 197 LBS | DIASTOLIC BLOOD PRESSURE: 74 MMHG | OXYGEN SATURATION: 98 %

## 2021-08-09 DIAGNOSIS — M54.10 POLYRADICULOPATHY: ICD-10-CM

## 2021-08-09 DIAGNOSIS — F32.A DEPRESSION, UNSPECIFIED DEPRESSION TYPE: Primary | ICD-10-CM

## 2021-08-09 DIAGNOSIS — G47.00 INSOMNIA, UNSPECIFIED TYPE: ICD-10-CM

## 2021-08-09 DIAGNOSIS — G62.9 NEUROPATHY: ICD-10-CM

## 2021-08-09 PROCEDURE — 99213 OFFICE O/P EST LOW 20 MIN: CPT | Performed by: NURSE PRACTITIONER

## 2021-08-09 RX ORDER — GABAPENTIN 400 MG/1
400 CAPSULE ORAL DAILY PRN
Qty: 30 CAPSULE | Refills: 0 | Status: SHIPPED | OUTPATIENT
Start: 2021-08-09 | End: 2021-09-08

## 2021-08-09 RX ORDER — GABAPENTIN 300 MG/1
300 CAPSULE ORAL DAILY PRN
Qty: 30 CAPSULE | Refills: 0 | Status: SHIPPED | OUTPATIENT
Start: 2021-08-09 | End: 2021-09-08

## 2021-08-09 RX ORDER — HYDROXYZINE HYDROCHLORIDE 25 MG/1
25 TABLET, FILM COATED ORAL NIGHTLY PRN
Qty: 30 TABLET | Refills: 1 | Status: SHIPPED | OUTPATIENT
Start: 2021-08-09 | End: 2021-08-24 | Stop reason: SDUPTHER

## 2021-08-09 RX ORDER — DOXEPIN HYDROCHLORIDE 100 MG/1
100 CAPSULE ORAL NIGHTLY
Qty: 30 CAPSULE | Refills: 3 | Status: SHIPPED | OUTPATIENT
Start: 2021-08-09 | End: 2021-08-24 | Stop reason: SDUPTHER

## 2021-08-09 ASSESSMENT — ENCOUNTER SYMPTOMS
SORE THROAT: 0
SHORTNESS OF BREATH: 0
DIARRHEA: 0
VOMITING: 0
ABDOMINAL PAIN: 0
SINUS PAIN: 0
COUGH: 0
NAUSEA: 0

## 2021-08-09 NOTE — PROGRESS NOTES
[address]  [address]  [address] [address]  Dept: [phone]  Dept Fax: [phone]    Micki Martin is a 39 y.o. female who presents today for her medicalconditions/complaints as noted below. Micki Martin is c/o of Other (pt is moving sept 1st. ), Discuss Medications (thinks the dose of doxepin is too much), and Depression      HPI:         72-year-old female patient presents with complaints of medication adjustment. Patient reportedly increased her doxepin from  and was tolerating this well and it was continued. Patient takes it at night to help her sleep however has been sleeping very poorly. Patient reports that her poor sleep and then transitions into increased nerve pain during the day. Patient believes she is not seeing the benefit of the doxepin at this time. Past Medical History:   Diagnosis Date    [address] cancer ([facility] [address]     right    Lumbar herniated disc     Polyradiculopathy 2015    Ulcerative colitis (H[facility])         Current Outpatient Medications   Medication Sig Dispense Refill    doxepin (SINEQUAN) 100 MG capsule Take 1 capsule by mouth nightly 30 capsule 3    hydrOXYzine (ATARAX) 25 MG tablet Take 1 tablet by mouth nightly as needed for Itching 30 tablet 1    gabapentin (NEURONTIN) 300 MG capsule Take 1 capsule by mouth daily as needed (nerve pain) for up to 30 days. Intended supply: 90 days 30 capsule 0    gabapentin (NEURONTIN) 400 MG capsule Take 1 capsule by mouth daily as needed (pain) for up to 30 days.  30 capsule 0    tiZANidine (ZANAFLEX) 4 MG tablet Take 1 tablet by mouth every 8 hours as needed (muscle spasm) 60 tablet 3    tamoxifen (NOLVADEX) 20 MG tablet Take 1 tablet by mouth daily 90 tablet 3    acetaminophen (TYLENOL) 325 MG tablet Take 650 mg by mouth every 6 hours as needed for Pain      diphenhydrAMINE (BENADRYL) 50 MG capsule Take 50 mg by mouth every 6 hours as needed for Itching (Patient not taking: Reported on 8/9/2021)      melatonin 3 MG TABS tablet Take 3 mg by mouth daily (Patient not taking: Reported on 8/9/2021)      diphenhydrAMINE-APAP, sleep, (TYLENOL PM EXTRA STRENGTH)  MG tablet Take 1 tablet by mouth nightly as needed for Sleep (Patient not taking: Reported on 8/9/2021)       No current facility-administered medications for this visit. Allergies   Allergen Reactions    Benzoyl Peroxide Swelling    Sulfamethoxazole-Trimethoprim Rash    Benzyl Alcohol Swelling     Eye lids swell    Hydromorphone      Bugs crawling on skin       Subjective:      Review of Systems   Constitutional: Negative for chills and fever. HENT: Negative for ear pain, sinus pain and sore throat. Respiratory: Negative for cough and shortness of breath. Cardiovascular: Negative for chest pain and palpitations. Gastrointestinal: Negative for abdominal pain, diarrhea, nausea and vomiting. Neurological: Negative for dizziness and headaches. Psychiatric/Behavioral: Positive for sleep disturbance. The patient is nervous/anxious. All other systems reviewed and are negative.      :Objective     Physical Exam  Vitals and nursing note reviewed. Constitutional:       Appearance: Normal appearance. Cardiovascular:      Rate and Rhythm: Normal rate. Pulmonary:      Effort: Pulmonary effort is normal.      Breath sounds: Normal breath sounds. Skin:     General: Skin is warm and dry. Neurological:      General: No focal deficit present. Mental Status: She is alert and oriented to person, place, and time. /74   Pulse 103   Resp 18   Ht 5' 5\" (1.651 m)   Wt 197 lb (89.4 kg)   SpO2 98%   BMI 32.78 kg/m²     Lab Review   No visits with results within 6 Month(s) from this visit.    Latest known visit with results is:   Hospital Outpatient Visit on 12/10/2020   Component Date Value    IgG,S cerevisiae Ab 12/10/2020 41.6*    IgA,S cerevisiae Ab 12/10/2020 10.8     Gliadin Deaminidated Pep* 12/10/2020 1.1     Gliadin Deaminidated Pep* 12/10/2020 1.0     IgA 12/10/2020 152     TISSUE TRANSGLUTAMINASE * 12/10/2020 0.7     Sed Rate 12/10/2020 24*       Assessment and Plan      1. Depression, unspecified depression type  -     doxepin (SINEQUAN) 100 MG capsule; Take 1 capsule by mouth nightly, Disp-30 capsule, R-3Normal  2. Insomnia, unspecified type  -     hydrOXYzine (ATARAX) 25 MG tablet; Take 1 tablet by mouth nightly as needed for Itching, Disp-30 tablet, R-1Normal  3. Polyradiculopathy  -     gabapentin (NEURONTIN) 300 MG capsule; Take 1 capsule by mouth daily as needed (nerve pain) for up to 30 days. Intended supply: 90 days, Disp-30 capsule, R-0Normal  -     gabapentin (NEURONTIN) 400 MG capsule; Take 1 capsule by mouth daily as needed (pain) for up to 30 days. , Disp-30 capsule, R-0Normal  4. Neuropathy  -     gabapentin (NEURONTIN) 300 MG capsule; Take 1 capsule by mouth daily as needed (nerve pain) for up to 30 days. Intended supply: 90 days, Disp-30 capsule, R-0Normal      Reduce doxepin from 150 to 100 mg nightly  And hydroxyzine at 25 mg, can increase to 2 tabs as needed  Dose adjustment on gabapentin from 300 twice daily to 300 in the morning and 400 mg in the evening  Med check in 3 months              No results found for this visit on 08/09/21. Return if symptoms worsen or fail to improve. Orders Placed This Encounter   Medications    doxepin (SINEQUAN) 100 MG capsule     Sig: Take 1 capsule by mouth nightly     Dispense:  30 capsule     Refill:  3    hydrOXYzine (ATARAX) 25 MG tablet     Sig: Take 1 tablet by mouth nightly as needed for Itching     Dispense:  30 tablet     Refill:  1    gabapentin (NEURONTIN) 300 MG capsule     Sig: Take 1 capsule by mouth daily as needed (nerve pain) for up to 30 days.  Intended supply: 90 days     Dispense:  30 capsule     Refill:  0    gabapentin (NEURONTIN) 400 MG capsule     Sig: Take 1 capsule by mouth daily as needed (pain) for up to 30 days. Dispense:  30 capsule     Refill:  0     300 in am, 400 in pm separate rx sent        Patient given educational materials - see patient instructions. Discussed use, benefit, and side effects of prescribed medications. All patientquestions answered. Pt voiced understanding. Patient given educational materials - see patient instructions. Discussed use, benefit, and side effects of prescribed medications. All patientquestions answered. Pt voiced understanding. This note was transcribed using dictation with Dragon services. Efforts were made to correct any errors but some words may be misinterpreted.     Electronically signed by ALVAREZ Rubio CNP on 8/9/2021at 5:53 PM

## 2021-08-24 ENCOUNTER — PATIENT MESSAGE (OUTPATIENT)
Dept: FAMILY MEDICINE CLINIC | Age: 46
End: 2021-08-24

## 2021-08-24 DIAGNOSIS — G47.00 INSOMNIA, UNSPECIFIED TYPE: ICD-10-CM

## 2021-08-24 DIAGNOSIS — M62.81 MUSCLE WEAKNESS: ICD-10-CM

## 2021-08-24 DIAGNOSIS — F32.A DEPRESSION, UNSPECIFIED DEPRESSION TYPE: ICD-10-CM

## 2021-08-24 RX ORDER — TIZANIDINE 4 MG/1
4 TABLET ORAL EVERY 8 HOURS PRN
Qty: 90 TABLET | Refills: 0 | Status: SHIPPED | OUTPATIENT
Start: 2021-08-24 | End: 2022-03-03 | Stop reason: SDUPTHER

## 2021-08-24 RX ORDER — DOXEPIN HYDROCHLORIDE 100 MG/1
100 CAPSULE ORAL NIGHTLY
Qty: 90 CAPSULE | Refills: 0 | Status: SHIPPED | OUTPATIENT
Start: 2021-08-24 | End: 2022-03-02 | Stop reason: SDUPTHER

## 2021-08-24 RX ORDER — HYDROXYZINE HYDROCHLORIDE 25 MG/1
25 TABLET, FILM COATED ORAL NIGHTLY PRN
Qty: 120 TABLET | Refills: 1 | Status: SHIPPED | OUTPATIENT
Start: 2021-08-24 | End: 2021-11-22

## 2021-08-24 NOTE — TELEPHONE ENCOUNTER
From: Kali Jain  To: ALVAREZ Rubio - CNP  Sent: 8/24/2021 3:31 PM EDT  Subject: Prescription Question    Good Afternoon,    The med changes we did are working well. We did discuss that I am moving at the end of the month. Could you write a new script for the Vistaril, doxepin, and zanaflex. 90 day scripts. The neurontin is working but I am taking it sparingly.      Thanks,    Faina Echols

## 2022-03-02 DIAGNOSIS — M62.81 MUSCLE WEAKNESS: ICD-10-CM

## 2022-03-02 DIAGNOSIS — F32.A DEPRESSION, UNSPECIFIED DEPRESSION TYPE: ICD-10-CM

## 2022-03-02 NOTE — TELEPHONE ENCOUNTER
LOV 8/9/21    Per message on 8/24/21 pt moved to [facility]! Could you send refills of my zanaflex, doxepin and tamoxifen. 90 days please. I will be paying out of pocket so 90 days makes it easier I attached the store info.   Thank you!     Rika Fischer

## 2022-03-03 RX ORDER — TAMOXIFEN CITRATE 20 MG/1
20 TABLET ORAL DAILY
Qty: 90 TABLET | Refills: 3 | Status: SHIPPED | OUTPATIENT
Start: 2022-03-03

## 2022-03-03 RX ORDER — TIZANIDINE 4 MG/1
4 TABLET ORAL EVERY 8 HOURS PRN
Qty: 90 TABLET | Refills: 2 | Status: SHIPPED | OUTPATIENT
Start: 2022-03-03

## 2022-03-03 RX ORDER — DOXEPIN HYDROCHLORIDE 100 MG/1
100 CAPSULE ORAL NIGHTLY
Qty: 90 CAPSULE | Refills: 2 | Status: SHIPPED | OUTPATIENT
Start: 2022-03-03 | End: 2022-06-01

## 2022-12-02 DIAGNOSIS — F32.A DEPRESSION, UNSPECIFIED DEPRESSION TYPE: ICD-10-CM

## 2022-12-03 RX ORDER — DOXEPIN HYDROCHLORIDE 100 MG/1
CAPSULE ORAL
Qty: 30 CAPSULE | Refills: 0 | OUTPATIENT
Start: 2022-12-03